# Patient Record
Sex: MALE | Race: WHITE
[De-identification: names, ages, dates, MRNs, and addresses within clinical notes are randomized per-mention and may not be internally consistent; named-entity substitution may affect disease eponyms.]

---

## 2018-04-26 PROBLEM — Z00.00 ENCOUNTER FOR PREVENTIVE HEALTH EXAMINATION: Status: ACTIVE | Noted: 2018-04-26

## 2018-05-03 ENCOUNTER — APPOINTMENT (OUTPATIENT)
Dept: SURGERY | Facility: CLINIC | Age: 63
End: 2018-05-03
Payer: COMMERCIAL

## 2018-05-03 VITALS — WEIGHT: 200 LBS | BODY MASS INDEX: 32.28 KG/M2

## 2018-05-03 PROCEDURE — 99203 OFFICE O/P NEW LOW 30 MIN: CPT

## 2018-06-28 ENCOUNTER — OUTPATIENT (OUTPATIENT)
Dept: OUTPATIENT SERVICES | Facility: HOSPITAL | Age: 63
LOS: 1 days | Discharge: HOME | End: 2018-06-28

## 2018-06-28 DIAGNOSIS — R05 COUGH: ICD-10-CM

## 2019-04-08 ENCOUNTER — EMERGENCY (EMERGENCY)
Facility: HOSPITAL | Age: 64
LOS: 0 days | Discharge: HOME | End: 2019-04-08
Admitting: PHYSICIAN ASSISTANT
Payer: MEDICARE

## 2019-04-08 VITALS
DIASTOLIC BLOOD PRESSURE: 85 MMHG | HEART RATE: 83 BPM | SYSTOLIC BLOOD PRESSURE: 177 MMHG | TEMPERATURE: 98 F | OXYGEN SATURATION: 98 % | RESPIRATION RATE: 18 BRPM

## 2019-04-08 DIAGNOSIS — H11.31 CONJUNCTIVAL HEMORRHAGE, RIGHT EYE: ICD-10-CM

## 2019-04-08 DIAGNOSIS — E78.5 HYPERLIPIDEMIA, UNSPECIFIED: ICD-10-CM

## 2019-04-08 DIAGNOSIS — H57.89 OTHER SPECIFIED DISORDERS OF EYE AND ADNEXA: ICD-10-CM

## 2019-04-08 DIAGNOSIS — I10 ESSENTIAL (PRIMARY) HYPERTENSION: ICD-10-CM

## 2019-04-08 PROCEDURE — 99282 EMERGENCY DEPT VISIT SF MDM: CPT

## 2019-04-08 NOTE — ED PROVIDER NOTE - NSFOLLOWUPINSTRUCTIONS_ED_ALL_ED_FT
Subconjunctival Hemorrhage    WHAT YOU NEED TO KNOW:    A subconjunctival hemorrhage is when blood collects under the conjunctiva in your eye. The conjunctiva is the clear lining that covers the white part of your eye. The blood comes from broken blood vessels under the conjunctiva.    DISCHARGE INSTRUCTIONS:    Care for your eye:     Cold or warm compress: Use a cold pack during the first 24 hours. Ask how often to apply it and for how long each time. After the first 24 hours, apply a warm pack on your eye. Do this 3 times each day for about 10 to 15 minutes each time.      Eyedrops: You may need artificial tears to keep your eye moist. Use the drops as directed.Steps 1 2 3 4         Follow up with your healthcare provider or eye specialist as directed: Write down your questions so you remember to ask them during your visits.    Contact your healthcare provider or eye specialist if:     The redness in your eye has not gone away after 3 weeks.      You have another subconjunctival hemorrhage.      You have subconjunctival hemorrhages in both eyes.      You have questions or concerns about your condition or care.    Return to the emergency department if:     You have eye pain or sensitivity to light.      Your vision changes.      You have white or yellow discharge from your eye.         © Copyright [employer] 2019 All illustrations and images included in CareNotes are the copyrighted property of [employer] or [employer]. Subconjunctival Hemorrhage    WHAT YOU NEED TO KNOW:    A subconjunctival hemorrhage is when blood collects under the conjunctiva in your eye. The conjunctiva is the clear lining that covers the white part of your eye. The blood comes from broken blood vessels under the conjunctiva.    DISCHARGE INSTRUCTIONS:      Follow up with your healthcare provider or eye specialist as tomorrow as scheduled.  Write down your questions so you remember to ask them during your visits.    Contact your healthcare provider or eye specialist if:     The redness in your eye has not gone away after 3 weeks.      You have another subconjunctival hemorrhage.      Return to the emergency department if:     You have eye pain or sensitivity to light.      Your vision changes.      You have white or yellow discharge from your eye.         © Copyright [employer] 2019 All illustrations and images included in CareNotes are the copyrighted property of [employer] or [employer].

## 2019-04-08 NOTE — ED PROVIDER NOTE - OBJECTIVE STATEMENT
64 y/o male with hx HTN, HDL, Glaucoma, Cataracts presents to the ED c/o "I woke up with right eye redness and I feel like something is scratching." no hx trauma/ eye pain/ change in vision/ HA/ dizziness/ nausea/ vomit/ weakness 62 y/o male with hx HTN, HDL, Glaucoma, Cataracts presents to the ED c/o "I woke up with right eye redness and I feel like something is scratching. I was sneezing a lot yesterday." no hx trauma/ eye pain/ change in vision/ HA/ dizziness/ nausea/ vomit/ weakness

## 2019-04-08 NOTE — ED PROVIDER NOTE - PROGRESS NOTE DETAILS
Patient told to return for eye pain or change in vision. Has appointment with his Optho tomorrow am.

## 2019-04-08 NOTE — ED ADULT TRIAGE NOTE - CHIEF COMPLAINT QUOTE
patient c/o right eye redness. patient denies any injury or change in vision but states his eye feels "scratched"

## 2019-04-08 NOTE — ED ADULT NURSE NOTE - OBJECTIVE STATEMENT
Pt presents to the ED with c/o waking up with right eye redness and irritation. Pt denies vision changes or pain.

## 2019-10-22 ENCOUNTER — EMERGENCY (EMERGENCY)
Facility: HOSPITAL | Age: 64
LOS: 0 days | Discharge: HOME | End: 2019-10-23
Attending: EMERGENCY MEDICINE | Admitting: EMERGENCY MEDICINE
Payer: MEDICARE

## 2019-10-22 VITALS
SYSTOLIC BLOOD PRESSURE: 152 MMHG | RESPIRATION RATE: 18 BRPM | TEMPERATURE: 97 F | HEIGHT: 66 IN | HEART RATE: 70 BPM | OXYGEN SATURATION: 99 % | DIASTOLIC BLOOD PRESSURE: 77 MMHG | WEIGHT: 197.98 LBS

## 2019-10-22 DIAGNOSIS — E78.5 HYPERLIPIDEMIA, UNSPECIFIED: ICD-10-CM

## 2019-10-22 DIAGNOSIS — I10 ESSENTIAL (PRIMARY) HYPERTENSION: ICD-10-CM

## 2019-10-22 DIAGNOSIS — R00.2 PALPITATIONS: ICD-10-CM

## 2019-10-22 DIAGNOSIS — M79.602 PAIN IN LEFT ARM: ICD-10-CM

## 2019-10-22 DIAGNOSIS — R42 DIZZINESS AND GIDDINESS: ICD-10-CM

## 2019-10-22 DIAGNOSIS — H40.9 UNSPECIFIED GLAUCOMA: ICD-10-CM

## 2019-10-22 DIAGNOSIS — R07.9 CHEST PAIN, UNSPECIFIED: ICD-10-CM

## 2019-10-22 DIAGNOSIS — M54.2 CERVICALGIA: ICD-10-CM

## 2019-10-22 PROBLEM — E78.00 PURE HYPERCHOLESTEROLEMIA, UNSPECIFIED: Chronic | Status: ACTIVE | Noted: 2019-04-08

## 2019-10-22 LAB
ALBUMIN SERPL ELPH-MCNC: 4.6 G/DL — SIGNIFICANT CHANGE UP (ref 3.5–5.2)
ALP SERPL-CCNC: 74 U/L — SIGNIFICANT CHANGE UP (ref 30–115)
ALT FLD-CCNC: 15 U/L — SIGNIFICANT CHANGE UP (ref 0–41)
ANION GAP SERPL CALC-SCNC: 13 MMOL/L — SIGNIFICANT CHANGE UP (ref 7–14)
AST SERPL-CCNC: 19 U/L — SIGNIFICANT CHANGE UP (ref 0–41)
BILIRUB SERPL-MCNC: 0.4 MG/DL — SIGNIFICANT CHANGE UP (ref 0.2–1.2)
BUN SERPL-MCNC: 18 MG/DL — SIGNIFICANT CHANGE UP (ref 10–20)
CALCIUM SERPL-MCNC: 9.7 MG/DL — SIGNIFICANT CHANGE UP (ref 8.5–10.1)
CHLORIDE SERPL-SCNC: 96 MMOL/L — LOW (ref 98–110)
CO2 SERPL-SCNC: 27 MMOL/L — SIGNIFICANT CHANGE UP (ref 17–32)
CREAT SERPL-MCNC: 0.9 MG/DL — SIGNIFICANT CHANGE UP (ref 0.7–1.5)
GLUCOSE SERPL-MCNC: 121 MG/DL — HIGH (ref 70–99)
HCT VFR BLD CALC: 43.5 % — SIGNIFICANT CHANGE UP (ref 42–52)
HGB BLD-MCNC: 15 G/DL — SIGNIFICANT CHANGE UP (ref 14–18)
MAGNESIUM SERPL-MCNC: 1.7 MG/DL — LOW (ref 1.8–2.4)
MCHC RBC-ENTMCNC: 31.1 PG — HIGH (ref 27–31)
MCHC RBC-ENTMCNC: 34.5 G/DL — SIGNIFICANT CHANGE UP (ref 32–37)
MCV RBC AUTO: 90.2 FL — SIGNIFICANT CHANGE UP (ref 80–94)
NRBC # BLD: 0 /100 WBCS — SIGNIFICANT CHANGE UP (ref 0–0)
NT-PROBNP SERPL-SCNC: 129 PG/ML — SIGNIFICANT CHANGE UP (ref 0–300)
PLATELET # BLD AUTO: 187 K/UL — SIGNIFICANT CHANGE UP (ref 130–400)
POTASSIUM SERPL-MCNC: 3.9 MMOL/L — SIGNIFICANT CHANGE UP (ref 3.5–5)
POTASSIUM SERPL-SCNC: 3.9 MMOL/L — SIGNIFICANT CHANGE UP (ref 3.5–5)
PROT SERPL-MCNC: 7.1 G/DL — SIGNIFICANT CHANGE UP (ref 6–8)
RBC # BLD: 4.82 M/UL — SIGNIFICANT CHANGE UP (ref 4.7–6.1)
RBC # FLD: 12.5 % — SIGNIFICANT CHANGE UP (ref 11.5–14.5)
SODIUM SERPL-SCNC: 136 MMOL/L — SIGNIFICANT CHANGE UP (ref 135–146)
TROPONIN T SERPL-MCNC: <0.01 NG/ML — SIGNIFICANT CHANGE UP
TROPONIN T SERPL-MCNC: <0.01 NG/ML — SIGNIFICANT CHANGE UP
WBC # BLD: 7.11 K/UL — SIGNIFICANT CHANGE UP (ref 4.8–10.8)
WBC # FLD AUTO: 7.11 K/UL — SIGNIFICANT CHANGE UP (ref 4.8–10.8)

## 2019-10-22 PROCEDURE — 93010 ELECTROCARDIOGRAM REPORT: CPT

## 2019-10-22 PROCEDURE — 71046 X-RAY EXAM CHEST 2 VIEWS: CPT | Mod: 26

## 2019-10-22 PROCEDURE — 99220: CPT

## 2019-10-22 PROCEDURE — 93010 ELECTROCARDIOGRAM REPORT: CPT | Mod: 77

## 2019-10-22 RX ORDER — AMLODIPINE BESYLATE 2.5 MG/1
10 TABLET ORAL ONCE
Refills: 0 | Status: COMPLETED | OUTPATIENT
Start: 2019-10-22 | End: 2019-10-22

## 2019-10-22 RX ORDER — ATORVASTATIN CALCIUM 80 MG/1
10 TABLET, FILM COATED ORAL ONCE
Refills: 0 | Status: COMPLETED | OUTPATIENT
Start: 2019-10-22 | End: 2019-10-22

## 2019-10-22 RX ADMIN — ATORVASTATIN CALCIUM 10 MILLIGRAM(S): 80 TABLET, FILM COATED ORAL at 20:51

## 2019-10-22 RX ADMIN — AMLODIPINE BESYLATE 10 MILLIGRAM(S): 2.5 TABLET ORAL at 20:51

## 2019-10-22 NOTE — ED PROVIDER NOTE - OBJECTIVE STATEMENT
65 yo M with hx of HTN, HDL, glaucoma, presents for evaluation of chest pain, radiating to the left neck and left arm, intermittent, ongoing for a couple of days, associated with difficulty breathing and lightheadedness on onset. No fever, no chills, no headache, no back pain, no abdominal pain, no recent abx use. Pt states that symptoms occurred in the past but they went away, this time they keep coming back. No other symptoms reported.   LISA Franklin

## 2019-10-22 NOTE — ED CDU PROVIDER INITIAL DAY NOTE - MEDICAL DECISION MAKING DETAILS
Patient presented with chest pain to ED. Initial work up including EKG, troponin negative. Plan is to place patient in obs for CCTA and echo. Asymptomatic at this time, no acute events overnight. Will monitor and follow up results.

## 2019-10-22 NOTE — ED PROVIDER NOTE - ATTENDING CONTRIBUTION TO CARE
65yo man h/o HTN, HLD, glaucoma c/o 1 week of chest discomfort, feels like skipped beats/palpitations, on exertion and at rest. Has had these sx before many years ago, last stress test was 2 years ago and normal. He is concerned because the chest discomfort is new, with some radiation down his left arm, and he feels unusually fatigued. On exam he is alert and nontoxic appearing, lungs CTA, CVS1S2 grade 2/6 systolic murmur (known to pt), abd soft, NT, trace b/l pitting edema. WIll check labs, EKG, CXR, speak with cardiology (Dr Cano), reassess.

## 2019-10-22 NOTE — ED PROVIDER NOTE - NEUROLOGICAL, MLM
Alert and oriented x 3, CN II-XII intact, 5/5 UE and LE strength, 5/5  strength, normal gait, ambulating with no difficulty, no focal deficits, no motor or sensory deficits.

## 2019-10-22 NOTE — ED ADULT NURSE NOTE - OBJECTIVE STATEMENT
Pt c/o intermittent chest palpitations that causes SOB. Pt states he also has pain radiating down left arm and dizziness when palpitations occur. Pt not in distress and does not have symptoms at this time. Pt last stress test was in 2017

## 2019-10-22 NOTE — ED PROVIDER NOTE - CLINICAL SUMMARY MEDICAL DECISION MAKING FREE TEXT BOX
Labs, EKG, CXR ok. Concerned for poss arrhythmia; will place in CDU for echo, CCTA vs nuc. Pt amenable to plan.

## 2019-10-22 NOTE — ED ADULT TRIAGE NOTE - CHIEF COMPLAINT QUOTE
"jonn been dizzy on and off for the past week. also last week I felt like I missed a beat in my heart but a long beat and then the dizziness started. I haVE A PAIN IN MY LEFT SHOULDER UP MY JAW AND DOWN TO MY ELBOW"

## 2019-10-22 NOTE — ED CDU PROVIDER INITIAL DAY NOTE - OBJECTIVE STATEMENT
63 y/o M, PMHx HTN, Dyslipidemia & Glaucoma, presents to the Observation Unit with complaints of chest pain x one week. Patient admits to having been with chest discomfort with radiation to his left arm over the past week however states that episodes of discomfort have been occurring more frequently. He denies associated dyspnea, nausea, vomiting, fever, chills, back pain, abdominal pain, recent travel/immobilization and recent known sick contacts. He is not a smoker; denies FHx of CAD. ED team spoke with patient's cardiologist who recommended observation unit placement for CCTA and echo.

## 2019-10-22 NOTE — ED ADULT NURSE REASSESSMENT NOTE - NS ED NURSE REASSESS COMMENT FT1
Patient assessed, alert and oriented x 3. Patient accompanied by family. Patient noted with nonsymptomatic bradycardia, on ongoing cardiac monitoring. MD aware. Patient voices no complaints at this time. Patient scheduled for CCTA/ Transthoracic Echocardiogram later today. Will continue to monitor.

## 2019-10-22 NOTE — ED ADULT NURSE NOTE - NSIMPLEMENTINTERV_GEN_ALL_ED
Implemented All Universal Safety Interventions:  [place] to call system. Call bell, personal items and telephone within reach. Instruct patient to call for assistance. Room bathroom lighting operational. Non-slip footwear when patient is off stretcher. Physically safe environment: no spills, clutter or unnecessary equipment. Stretcher in lowest position, wheels locked, appropriate side rails in place.

## 2019-10-23 VITALS
HEART RATE: 57 BPM | OXYGEN SATURATION: 97 % | TEMPERATURE: 98 F | SYSTOLIC BLOOD PRESSURE: 144 MMHG | DIASTOLIC BLOOD PRESSURE: 21 MMHG | RESPIRATION RATE: 18 BRPM

## 2019-10-23 PROCEDURE — 99217: CPT

## 2019-10-23 PROCEDURE — 93306 TTE W/DOPPLER COMPLETE: CPT | Mod: 26

## 2019-10-23 PROCEDURE — 75574 CT ANGIO HRT W/3D IMAGE: CPT | Mod: 26

## 2019-10-23 NOTE — ED ADULT NURSE REASSESSMENT NOTE - NS ED NURSE REASSESS COMMENT FT1
patient a.ox4 at bedside, patient states he feels moments of weakness and dizziness unknown onset. patient on cardiac monitor showing sinus bradycardia. denies any chest pain or sob. patient awaiting ccta. will continue to assess and monitor

## 2019-10-23 NOTE — ED CDU PROVIDER DISPOSITION NOTE - CARE PROVIDER_API CALL
Beena Cano)  Cardiology; Interventional Cardiology  [address] [place] [address]  Phone: [phone]  Fax: [phone]  Follow Up Time:

## 2019-10-23 NOTE — ED CDU PROVIDER DISPOSITION NOTE - CLINICAL COURSE
Patient presented with chest pain. Initial work up in ED negative including non-ischemic EKG, negative troponin. Placed in obs for further ACS rule out. Obtained serial trops which were negative. CCTA done and showed CAD RA score of 1. Echo done and without any significant abnormalities. Patient asymptomatic during obs course. Ambulatory, tolerates PO. Will discharge home with outpatient follow up. Patient agreeable with plan. Agrees to return to ED for any new or worsening symptoms.

## 2019-10-23 NOTE — ED CDU PROVIDER DISPOSITION NOTE - PATIENT PORTAL LINK FT
You can access the FollowMyHealth Patient Portal offered by [facility] by registering at the following website: [email or web address]. By joining [facility]’s FollowMyHealth portal, you will also be able to view your health information using other applications (apps) compatible with our system.

## 2019-10-23 NOTE — CONSULT NOTE ADULT - SUBJECTIVE AND OBJECTIVE BOX
Date of Admission:    CHIEF COMPLAINT: Palpitations    HISTORY OF PRESENT ILLNESS: 64yMale with PMH below presented to the hospital for palpitation, with chest pain, and dizziness    PAST MEDICAL & SURGICAL HISTORY:  High cholesterol  HTN (hypertension)  JAVI on CPAP    HEALTH ISSUES - PROBLEM Dx:        FAMILY HISTORY:    Allergies    No Known Allergies    Intolerances    	  Home Medications:    MEDICATIONS  (STANDING):    MEDICATIONS  (PRN):              SOCIAL HISTORY:    [x ] Non-smoker  [ ] Smoker  [ ] Alcohol      REVIEW OF SYSTEMS:  CONSTITUTIONAL: No fever, weight loss, or fatigue  CARDIOLOGY: PAtient denies chest pain, shortness of breath or syncopal episodes.   RESPIRATORY: denies shortness of breath, wheezeing.   NEUROLOGICAL: NO weakness, no focal deficits to report.  ENDOCRINOLOGICAL: no recent change in diabetic medications.   GI: no BRBPR, no N,V,diarrhea.    PSYCHIATRY: normal mood and affect  HEENT: no nasal discharge, no ecchymosis  SKIN: no ecchymosis, no breakdown  MUSCULOSKELETAL: Full range of motion x4.      PHYSICAL EXAM:  T(C): 36.7 (10-23-19 @ 07:58), Max: 36.7 (10-23-19 @ 07:58)  HR: 57 (10-23-19 @ 07:58) (50 - 58)  BP: 144/21 (10-23-19 @ 07:58) (126/77 - 144/21)  RR: 18 (10-23-19 @ 07:58) (18 - 18)  SpO2: 97% (10-23-19 @ 07:58) (97% - 98%)  Wt(kg): --  I&O's Summary    Daily     Daily     General Appearance: Normal	  Cardiovascular: Normal S1 S2, No JVD, No murmurs, No edema  Respiratory: Lungs clear to auscultation	  Psychiatry: A & O x 3, Mood & affect appropriate  Gastrointestinal:  Soft, Non-tender  Skin: No rashes, No ecchymoses, No cyanosis	  Neurologic: Non-focal  Extremities: Normal range of motion, No clubbing, cyanosis or edema  Vascular: Peripheral pulses palpable 2+ bilaterally        LABS:	 	                          15.0   7.11  )-----------( 187      ( 22 Oct 2019 11:54 )             43.5     10-22    136  |  96<L>  |  18  ----------------------------<  121<H>  3.9   |  27  |  0.9    Ca    9.7      22 Oct 2019 11:54  Mg     1.7     10-22    TPro  7.1  /  Alb  4.6  /  TBili  0.4  /  DBili  x   /  AST  19  /  ALT  15  /  AlkPhos  74  10-22    CARDIAC MARKERS ( 22 Oct 2019 16:19 )  x     / <0.01 ng/mL / x     / x     / x      CARDIAC MARKERS ( 22 Oct 2019 11:54 )  x     / <0.01 ng/mL / x     / x     / x              proBNP:   Lipid Profile:   HgA1c:   TSH:       CARDIAC MARKERS:            TELEMETRY EVENTS: 	    ECG:  	  RADIOLOGY:  OTHER: 	    PREVIOUS DIAGNOSTIC TESTING:    [ ] Echocardiogram:  [ ]  Catheterization:  [ ] Stress Test:  	  	  ASSESSMENT/PLAN: 	    Echo and CT scan reviewed  Positive caicium score  discharge home and follow up with EP

## 2019-10-23 NOTE — ED ADULT NURSE REASSESSMENT NOTE - NS ED NURSE REASSESS COMMENT FT1
Patient assessed, alert and oriented x 3. Patient resting comfortably in bed. Patient remains on ongoing cardiac monitoring, noted with sinus bradycardia. Patient voices no complaints at this time. Patient scheduled for CCTA/ Transthoracic Echocardiogram later today. Safety and comfort measures performed. Will continue to monitor.

## 2019-10-23 NOTE — ED CDU PROVIDER SUBSEQUENT DAY NOTE - PROGRESS NOTE DETAILS
pt. in no distress, negative trops x2. will get ccta in the morning. will continue to reassess. pt seen bedside, NAD, no complaints overnight, asymptomatic. Negative cardiac enzymes x2 and nl ekg. pt scheduled to go for  CCTA. Will continue to monitor patient.

## 2020-05-11 NOTE — ED CDU PROVIDER SUBSEQUENT DAY NOTE - MEDICAL DECISION MAKING DETAILS
Patient presented with chest pain, lightheadedness in ED. Initial work up in ED without significant abnormalities including non-ischemic EKG, negative troponin. Plan is to place in obs for serial trops, CCTA, echo. Patient otherwise HD stable. CCTA and echo pending. Will re-evaluate after work up. No

## 2021-01-01 NOTE — ED PROVIDER NOTE - ENMT, MLM
Minimal
Airway patent, Nasal mucosa clear. Mouth with normal mucosa. Throat has no vesicles, no oropharyngeal exudates and uvula is midline.

## 2022-06-02 ENCOUNTER — APPOINTMENT (OUTPATIENT)
Dept: SURGERY | Facility: CLINIC | Age: 67
End: 2022-06-02
Payer: MEDICARE

## 2022-06-02 VITALS — HEIGHT: 66 IN | WEIGHT: 198 LBS | BODY MASS INDEX: 31.82 KG/M2

## 2022-06-02 DIAGNOSIS — S76.219A STRAIN OF ADDUCTOR MUSCLE, FASCIA AND TENDON OF UNSPECIFIED THIGH, INITIAL ENCOUNTER: ICD-10-CM

## 2022-06-02 DIAGNOSIS — E66.09 OTHER OBESITY DUE TO EXCESS CALORIES: ICD-10-CM

## 2022-06-02 PROCEDURE — 99203 OFFICE O/P NEW LOW 30 MIN: CPT

## 2022-06-02 NOTE — ASSESSMENT
[FreeTextEntry1] : Edvin is a pleasant 66-year-old retired gentleman with a past medical history significant for hypertension, hypercholesterolemia, sleep apnea, anxiety and depression along with 5 inguinal hernia repairs in the past with his most recent bilateral recurrent hernia repair approximately 9 years ago by Dr. Philip.  He initially saw me in May 2018 with left groin discomfort which began after shoveling snow and was diagnosed with a significant groin muscle strain which improved spontaneously.  He presents back to the office now with bilateral groin pain which began approximately 1 month ago after straining during a significant bowel movement.  As before, his symptoms have nearly resolved but he kept his appointment today for reassurance.\par \par Physical examination demonstrates well-healed scars in both groins with no evidence of hernia recurrence or delayed wound complications on either side.  He does have some moderate weakness in the left inguinal floor but no obvious hernia.  Both testicles are normal.  His umbilical examination demonstrates a moderate weakness but no obvious hernia.  He does have a mild to moderate diastases recti likely related to his excess abdominal weight.  He has gained 6 pounds since his last visit with me and his current BMI is now 32.\par \par Edvin was counseled and reassured.  I believe his symptoms are related to significant muscle strain due to overexertion and chronic weakness in both groins due to multiple repairs in the past and since his symptoms have already resolved no intervention is necessary.  We also again discussed the importance of calorie restriction and healthy eating with regard to weight loss, hernia recurrence and his overall health.  He may return to me in the future if any new issues arise, of course.

## 2022-06-02 NOTE — CONSULT LETTER
[FreeTextEntry1] : Dear Dr. Madi Franklin, \par \par I had the pleasure of seeing your patient, JONO SOLOMON, in my office today. Please see my note below. \par \par Thank you very much for allowing me to participate in the care of this patient. If you have any questions, please do not hesitate to contact me. \par \par \par Respectfully,\par \par Mitchel Leon M.D., FACS

## 2022-06-02 NOTE — PHYSICAL EXAM
[JVD] : no jugular venous distention  [Normal Breath Sounds] : Normal breath sounds [No Rash or Lesion] : No rash or lesion [Alert] : alert [Calm] : calm [de-identified] : Overweight [de-identified] : normal [de-identified] : protuberant abdomen, moderate diastases recti [de-identified] : normal testicles [de-identified] : no evidence of hernia recurrence

## 2022-12-09 ENCOUNTER — APPOINTMENT (OUTPATIENT)
Dept: CARDIOLOGY | Facility: CLINIC | Age: 67
End: 2022-12-09

## 2022-12-09 VITALS
BODY MASS INDEX: 30.61 KG/M2 | DIASTOLIC BLOOD PRESSURE: 79 MMHG | SYSTOLIC BLOOD PRESSURE: 128 MMHG | TEMPERATURE: 97.9 F | WEIGHT: 195 LBS | HEART RATE: 57 BPM | HEIGHT: 67 IN

## 2022-12-09 DIAGNOSIS — Z78.9 OTHER SPECIFIED HEALTH STATUS: ICD-10-CM

## 2022-12-09 DIAGNOSIS — Z87.891 PERSONAL HISTORY OF NICOTINE DEPENDENCE: ICD-10-CM

## 2022-12-09 DIAGNOSIS — Z84.89 FAMILY HISTORY OF OTHER SPECIFIED CONDITIONS: ICD-10-CM

## 2022-12-09 PROCEDURE — 99205 OFFICE O/P NEW HI 60 MIN: CPT | Mod: 25

## 2022-12-09 PROCEDURE — 93000 ELECTROCARDIOGRAM COMPLETE: CPT

## 2022-12-14 ENCOUNTER — NON-APPOINTMENT (OUTPATIENT)
Age: 67
End: 2022-12-14

## 2022-12-18 NOTE — END OF VISIT
[FreeTextEntry3] : I, Zeyad Pierce, personally performed the services described in this documentation. All medical record entries made by the scribe/nurse CTA were at my direction and in my presence. I have reviewed the chart and agree that the record reflects my personal performance and is accurate and complete.\par  [Time Spent: ___ minutes] : I have spent [unfilled] minutes of time on the encounter.

## 2022-12-18 NOTE — ADDENDUM
[FreeTextEntry1] : Zaida GILBERT assisted in documentation on 12/10/2022 acting as a scribe for Stan Gama.\par

## 2022-12-18 NOTE — CARDIOLOGY SUMMARY
[de-identified] : (12/9/2022)/ Sinus Rhythm at 57 bpm, LVH, 1st degree AV block with  ms, no significant STT wave abnormalities. \par (11/14/2022)/ Coarse Atrial fibrillation at 58 bpm, possibly atrial flutter. \par (10/11/2022)/ Typical atrial flutter, with 4:1 AV conduction and ventricular rate at 58 bpm. [de-identified] :  (6/6/2022)/ 2D echo LV size normal, LV EF 57%, LA mildly dilated, mild-moderate valve stenosis, mild MR, mild TR, no other abnormalities. \par (10/26/2020)/ 2D echo mild LVH, normal LV EF, LA size is normal, mild-moderate aortic stenosis, trace MR, trace TR, no other abnormalities.

## 2022-12-18 NOTE — REASON FOR VISIT
[Arrhythmia/ECG Abnorrmalities] : arrhythmia/ECG abnormalities [FreeTextEntry3] : Dr. Madi Franklin - Dr. Beena Cano

## 2022-12-18 NOTE — HISTORY OF PRESENT ILLNESS
[FreeTextEntry1] : Hypertension, Hyperlipidemia, Moderate Aortic Stenosis, Obstructive Sleep Apnea on CPAP, Obesity with a BMI of 30.5, new onset Paroxysmal Atrial Fibrillation, and Atrial Flutter.\par \par Patient was diagnosed with an atrial flutter on a routine ECG with his primary care doctor in October 2022. He saw Dr. Cano on 11/14/2022, and ECG showed atrial fibrillation. He is being treated with Metoprolol for rate control and Xarelto for stroke prevention. Patient has no bleeding on Xarelto. He reports feeling tired and short of breath when he has afib. He also reports feeling dizzy and fainty without having any real syncope. He had dizziness for a long time, but in the recent few weeks, the dizziness got worse. Patient has no angina, no dyspnea on exertion when in sinus rhythm, and no palpitations. He is scheduled for a CTA over coronaries at [facility] on 1/26/2022. \par \par He presents today in sinus rhythm. \par \par He is here to discuss the management of atrial fibrillation.

## 2022-12-18 NOTE — DISCUSSION/SUMMARY
[FreeTextEntry1] : Mr. Edvin Ribeiro is a pleasant 67-year-old man with hypertension, hyperlipidemia, obstructive sleep apnea on CPAP, moderate aortic stenosis, paroxysmal atrial fibrillation, and atrial flutter. Patient was diagnosed with atrial flutter in October 2022 and atrial fibrillation in November 2022. He is on Metoprolol for rate control, and he is on Xarelto.\par \par I discussed with patient the management strategy for atrial fibrillation at length, including medical therapy with calcium channel blocker and beta blocker, antiarrhythmics therapy, and catheter ablation. I also discussed stroke prevention at length. I recommend continuing the same medication as patient is rate controlled at metoprolol 50. I also recommend continuing Xarelto for stroke prevention.\par \par In view of his dizziness, patient might be having a period of bradycardia. I recommend a four-week MCOT to correlate between his dizziness, lightheadedness, and feeling fainty and any Bradyarrhythmias secondary to medications. Other possibilities for his dizziness are conversion pauses as he is today sinus rhythm, and he was in atrial fibrillation on November 14. \par \par Since his symptoms do not occur on a daily basis, a Holter monitor is less likely to be helpful in his management. I recommend a 4 weeks event monitor to evaluate for the etiology of his symptoms. I educated the patient on the use of symptoms’ trigger feature of the event monitor. I will reassess patient after completion of the 4 weeks event monitor.\par \par I discussed with him the plan of care in great details. I answered all his questions and he was satisfied with the visit. Patient will follow with me in 4-6 weeks' time. Please do not hesitate to contact me at [phone] if you have any questions regarding his care.\par \par The management strategies for atrial fibrillation were discussed focusing on the issues of stroke prevention, heart rate control and rhythm control. The options of rate control, antiarrhythmic drug therapy, and electrophysiologic testing and catheter ablation therapy were discussed at length. As he is not keen on long term antiarrhythmic medication, he is a good candidate for catheter ablation of atrial fibrillation in the form of pulmonary vein isolation. I have discussed the procedure with him in great detail. He was told this procedure is performed under anesthesia with the duration of about four hours. Possible complications include but not limited to bleeding, vascular injury, groin complications, cardiac tamponade, stroke, esophageal injury, pulmonary vein stenosis, need for pacemaker, need for cardiac surgery, and rare risks of esophageal fistula/stroke/heart attack/death. Intracardiac echo is used to monitor the procedure. In addition, we use a temperature probe in the esophagus to prevent lesions. The success rate is in the range 70-80%. About 20% of patients require a second procedure for pulmonary vein reconnection. \par \par Procedure will be planned on \par \par He verbalized understanding of the discussion and all questions were addressed and answered. Patient is not sure whether to proceed with catheter ablation or not, he had many questions he and his wife that I answered at length. I will schedule an RN to follow-up call with patient in 2 weeks regarding his decision on whether to proceed with catheter ablation in the month of March 2023. Patient daughter is an RN that works at [employer].\par \par I discussed with patient plan of care in great details. I answered all his questions to his satisfaction. Patient was pleased with the visit.\par \par Patient will follow with me in 2 months’ time. Please do not hesitate to contact me at [phone] if you have any further questions regarding this patient care.\par \par  [EKG obtained to assist in diagnosis and management of assessed problem(s)] : EKG obtained to assist in diagnosis and management of assessed problem(s)

## 2022-12-29 ENCOUNTER — EMERGENCY (EMERGENCY)
Facility: HOSPITAL | Age: 67
LOS: 0 days | Discharge: HOME | End: 2022-12-29
Attending: EMERGENCY MEDICINE | Admitting: EMERGENCY MEDICINE
Payer: MEDICARE

## 2022-12-29 VITALS
RESPIRATION RATE: 18 BRPM | SYSTOLIC BLOOD PRESSURE: 190 MMHG | TEMPERATURE: 100 F | WEIGHT: 195.11 LBS | OXYGEN SATURATION: 98 % | HEART RATE: 100 BPM | DIASTOLIC BLOOD PRESSURE: 95 MMHG

## 2022-12-29 DIAGNOSIS — I10 ESSENTIAL (PRIMARY) HYPERTENSION: ICD-10-CM

## 2022-12-29 DIAGNOSIS — H01.003 UNSPECIFIED BLEPHARITIS RIGHT EYE, UNSPECIFIED EYELID: ICD-10-CM

## 2022-12-29 DIAGNOSIS — H57.89 OTHER SPECIFIED DISORDERS OF EYE AND ADNEXA: ICD-10-CM

## 2022-12-29 DIAGNOSIS — H00.033 ABSCESS OF EYELID RIGHT EYE, UNSPECIFIED EYELID: ICD-10-CM

## 2022-12-29 PROCEDURE — 99283 EMERGENCY DEPT VISIT LOW MDM: CPT

## 2022-12-29 RX ORDER — FLUORESCEIN SODIUM 9 MG
1 STRIP OPHTHALMIC (EYE) ONCE
Refills: 0 | Status: COMPLETED | OUTPATIENT
Start: 2022-12-29 | End: 2022-12-29

## 2022-12-29 RX ORDER — CEPHALEXIN 500 MG
1 CAPSULE ORAL
Qty: 28 | Refills: 0
Start: 2022-12-29 | End: 2023-01-04

## 2022-12-29 RX ADMIN — Medication 1 DROP(S): at 09:35

## 2022-12-29 RX ADMIN — Medication 1 APPLICATION(S): at 09:35

## 2022-12-29 NOTE — ED PROVIDER NOTE - NSFOLLOWUPCLINICS_GEN_ALL_ED_FT
[facility]  Ophthalmolgy  [address], [address]  [place] [address]  Phone: [phone]  Fax:

## 2022-12-29 NOTE — ED PROVIDER NOTE - NSFOLLOWUPINSTRUCTIONS_ED_ALL_ED_FT
please call to make an appointment at Ophthalmology clinic for follow up        Cellulitis, Adult    A person's legs and feet. One leg is normal and the other leg is affected by cellulitis.   Cellulitis is a skin infection. The infected area is usually warm, red, swollen, and tender. This condition occurs most often in the arms and lower legs. The infection can travel to the muscles, blood, and underlying tissue and become serious. It is very important to get treated for this condition.      What are the causes?    Cellulitis is caused by bacteria. The bacteria enter through a break in the skin, such as a cut, burn, insect bite, open sore, or crack.      What increases the risk?    This condition is more likely to occur in people who:  •Have a weak body defense system (immune system).      •Have open wounds on the skin, such as cuts, burns, bites, and scrapes. Bacteria can enter the body through these open wounds.      •Are older than 60 years of age.      •Have diabetes.      •Have a type of long-lasting (chronic) liver disease (cirrhosis) or kidney disease.      •Are obese.    •Have a skin condition such as:  •Itchy rash (eczema).      •Slow movement of blood in the veins (venous stasis).      •Fluid buildup below the skin (edema).        •Have had radiation therapy.      •Use IV drugs.        What are the signs or symptoms?    Symptoms of this condition include:  •Redness, streaking, or spotting on the skin.      •Swollen area of the skin.      •Tenderness or pain when an area of the skin is touched.      •Warm skin.      •A fever.      •Chills.      •Blisters.        How is this diagnosed?    This condition is diagnosed based on a medical history and physical exam. You may also have tests, including:  •Blood tests.      •Imaging tests.        How is this treated?    Treatment for this condition may include:  •Medicines, such as antibiotic medicines or medicines to treat allergies (antihistamines).      •Supportive care, such as rest and application of cold or warm cloths (compresses) to the skin.      •Hospital care, if the condition is severe.      The infection usually starts to get better within 1–2 days of treatment.      Follow these instructions at home:  A comparison of three sample cups showing dark yellow, yellow, and pale yellow urine.   Medicines     •Take over-the-counter and prescription medicines only as told by your health care provider.      •If you were prescribed an antibiotic medicine, take it as told by your health care provider. Do not stop taking the antibiotic even if you start to feel better.      General instructions     •Drink enough fluid to keep your urine pale yellow.      • Do not touch or rub the infected area.      •Raise (elevate) the infected area above the level of your heart while you are sitting or lying down.      •Apply warm or cold compresses to the affected area as told by your health care provider.      •Keep all follow-up visits as told by your health care provider. This is important. These visits let your health care provider make sure a more serious infection is not developing.        Contact a health care provider if:    •You have a fever.      •Your symptoms do not begin to improve within 1–2 days of starting treatment.      •Your bone or joint underneath the infected area becomes painful after the skin has healed.      •Your infection returns in the same area or another area.      •You notice a swollen bump in the infected area.      •You develop new symptoms.      •You have a general ill feeling (malaise) with muscle aches and pains.        Get help right away if:    •Your symptoms get worse.      •You feel very sleepy.      •You develop vomiting or diarrhea that persists.      •You notice red streaks coming from the infected area.      •Your red area gets larger or turns dark in color.      These symptoms may represent a serious problem that is an emergency. Do not wait to see if the symptoms will go away. Get medical help right away. Call your local emergency services (911 in the [place] Do not drive yourself to the hospital.       Summary    •Cellulitis is a skin infection. This condition occurs most often in the arms and lower legs.      •Treatment for this condition may include medicines, such as antibiotic medicines or antihistamines.      •Take over-the-counter and prescription medicines only as told by your health care provider. If you were prescribed an antibiotic medicine, do not stop taking the antibiotic even if you start to feel better.      •Contact a health care provider if your symptoms do not begin to improve within 1–2 days of starting treatment or your symptoms get worse.      •Keep all follow-up visits as told by your health care provider. This is important. These visits let your health care provider make sure that a more serious infection is not developing.      This information is not intended to replace advice given to you by your health care provider. Make sure you discuss any questions you have with your health care provider.

## 2022-12-29 NOTE — ED PROVIDER NOTE - PHYSICAL EXAMINATION
CONST: Well appearing in NAD  EYES: PERRL, EOMI, Sclera and conjunctiva clear. No injection. R blepharitis, erythematous, with area of scabbed over excoriation.  Flattened maculopapular lesions 2-3 to R temple. No vesicles. Visual acuity 20/25 bilaterally  ENT: No nasal discharge. TM's clear B/L without drainage. No vesicles in ear. Oropharynx normal appearing, no erythema or exudates. Uvula midline. Apthous ulcers to R buccal mucosa. (pt denies mouth pain)  NECK: Non-tender  CARD: Normal S1 S2; Normal rate and rhythm  RESP: Equal BS B/L, No wheezes, rhonchi or rales. No distress  GI: Soft, non-tender, non-distended.  MS: Normal ROM in all extremities. No midline spinal tenderness.  SKIN: Warm, dry, no acute rashes. Good turgor  NEURO: A&Ox3, No focal deficits. Strength 5/5 with no sensory deficits. Steady gait

## 2022-12-29 NOTE — ED PROVIDER NOTE - PATIENT PORTAL LINK FT
You can access the FollowMyHealth Patient Portal offered by [facility] by registering at the following website: [email or web address]. By joining [facility]’s FollowMyHealth portal, you will also be able to view your health information using other applications (apps) compatible with our system.

## 2022-12-29 NOTE — ED PROVIDER NOTE - CLINICAL SUMMARY MEDICAL DECISION MAKING FREE TEXT BOX
Patient with right eye cellulitis and blepharitis there is no corneal uptake we will treat with antibiotics outpatient follow-up of note patient has aphthous ulcers to the left posterior oropharynx of unclear etiology.

## 2022-12-29 NOTE — ED ADULT TRIAGE NOTE - CHIEF COMPLAINT QUOTE
Patient c/o right eye redness, swelling with spots radiating to mouth. Drainage present to site, does not cross midline. Patient states site is not painful but "pinching sensation is present"

## 2022-12-29 NOTE — ED PROVIDER NOTE - PROVIDER TOKENS
FREE:[LAST:[CARE PROVIDER],FIRST:[YOUR PRIMARY],PHONE:[(   )    -],FAX:[(   )    -],ADDRESS:[IN 48HRS]]

## 2022-12-29 NOTE — ED PROVIDER NOTE - NS ED ROS FT
CONST: No fever, chills or bodyaches  EYES: No pain, redness, drainage or visual changes.  ENT: No ear pain or discharge, nasal discharge or congestion. No sore throat  CARD: No chest pain, palpitations  RESP: No SOB, cough  MS: No joint pain, back pain or extremity pain/injury  SKIN: see HPI  NEURO: No headache, dizziness, paresthesias

## 2022-12-29 NOTE — ED PROVIDER NOTE - OBJECTIVE STATEMENT
66yo male with PMHx HTN presents c/o redness swelling overlying R eye lid and side of face x 4 days. Pt reports awoke with redness, swelling over R eyelid 4 days and now noticing rash to side of R temple. Pt denies pain. Denies itching. Denies blurry vision or eye pain. Pt states he is unsure of origin but either thinks it is due to a scratch on his eyelid that he self induced, that he kept picking out, or due to chemical exposure. Neither were wearing eye portection. Notes two weeks prior him and wife were using harsh chemicals to clean bathroom and the following day wife had similar R eye swelling that resolved in several days. No aggravating or relieving factors to rash. No hearing changes or ear pain. No vesicles or drainage.

## 2022-12-29 NOTE — ED PROVIDER NOTE - ATTENDING APP SHARED VISIT CONTRIBUTION OF CARE
67-year-old male history of hypertension here evaluation of right eye redness swelling to the right eyelid and face for last 4 days.  Patient reports woke up with redness swelling is now noticing as well as rash to the right temple.  He has no pain to the eye no fever no chills he does endorse he scratched his eyelid which might of caused his initial exposure.  Agree with above exam  Patient with right eye cellulitis and blepharitis there is no corneal uptake we will treat with antibiotics outpatient follow-up of note patient has aphthous ulcers to the left posterior oropharynx of unclear etiology.

## 2022-12-31 NOTE — ED PROCEDURE NOTE - PROCEDURE ADDITIONAL DETAILS
2gtts tetracaine instilled to R eye. No fluorescein uptake noted. No dendritic lesions. No FB or abrasions.

## 2023-01-12 NOTE — ED ADULT NURSE NOTE - NS ED NURSE LEVEL OF CONSCIOUSNESS ORIENTATION
-- DO NOT REPLY / DO NOT REPLY ALL --  -- Message is from Engagement Center Operations ([facility]) --    Offered Waitlist if Available for the Visit Type? Yes    Caller is requesting an appointment - at a sooner time than what was available.      Caller wants sooner appointment - offered other approved options    Reason for Visit: Complete physical     Is the patient currently scheduled? No    Preferred time to be seen: n/a    Caller Information       Type Contact Phone/Fax    01/11/2023 04:02 PM CST Phone (Incoming) Kecia Gutierrez (Self) [phone] (M)          Alternative phone number: [phone] ()    Can a detailed message be left? Yes    Message Turnaround: [place]:    Refer to site's KB page for routing instructions    Please give this turnaround time to the caller:   \"You can expect to receive a response 1-3 business days after your provider's clinical team reviews the message\"  
Outreach attempt was made to schedule a follow up visit. This was the first attempt. Contact was made, appointment scheduled.  
Tried to offer sooner appointment, went to voice mail.  Left voice mail.    Will forward to schedulers. If something urgent, fine to put in any 30-40 minute slot (depending on if before or after the scheduling change).    Sandy Palafox MD, Internal Medicine  1/11/2023 4:28 PM   
Oriented - self; Oriented - place; Oriented - time

## 2023-02-07 NOTE — ED ADULT TRIAGE NOTE - RESPIRATORY RATE (BREATHS/MIN)
18 Topical Steroids Applications Pregnancy And Lactation Text: Most topical steroids are considered safe to use during pregnancy and lactation.  Any topical steroid applied to the breast or nipple should be washed off before breastfeeding.

## 2023-02-10 ENCOUNTER — OUTPATIENT (OUTPATIENT)
Dept: INPATIENT UNIT | Facility: HOSPITAL | Age: 68
LOS: 1 days | Discharge: HOME | End: 2023-02-10
Payer: MEDICARE

## 2023-02-10 DIAGNOSIS — Z98.890 OTHER SPECIFIED POSTPROCEDURAL STATES: Chronic | ICD-10-CM

## 2023-02-10 DIAGNOSIS — I20.0 UNSTABLE ANGINA: ICD-10-CM

## 2023-02-10 DIAGNOSIS — I48.0 PAROXYSMAL ATRIAL FIBRILLATION: ICD-10-CM

## 2023-02-10 LAB
ANION GAP SERPL CALC-SCNC: 10 MMOL/L — SIGNIFICANT CHANGE UP (ref 7–14)
BUN SERPL-MCNC: 19 MG/DL — SIGNIFICANT CHANGE UP (ref 10–20)
CALCIUM SERPL-MCNC: 10.2 MG/DL — SIGNIFICANT CHANGE UP (ref 8.4–10.5)
CHLORIDE SERPL-SCNC: 100 MMOL/L — SIGNIFICANT CHANGE UP (ref 98–110)
CO2 SERPL-SCNC: 32 MMOL/L — SIGNIFICANT CHANGE UP (ref 17–32)
CREAT SERPL-MCNC: 0.9 MG/DL — SIGNIFICANT CHANGE UP (ref 0.7–1.5)
EGFR: 92 ML/MIN/1.73M2 — SIGNIFICANT CHANGE UP
GLUCOSE SERPL-MCNC: 136 MG/DL — HIGH (ref 70–99)
HCT VFR BLD CALC: 46.1 % — SIGNIFICANT CHANGE UP (ref 42–52)
HGB BLD-MCNC: 15.7 G/DL — SIGNIFICANT CHANGE UP (ref 14–18)
MCHC RBC-ENTMCNC: 30.6 PG — SIGNIFICANT CHANGE UP (ref 27–31)
MCHC RBC-ENTMCNC: 34.1 G/DL — SIGNIFICANT CHANGE UP (ref 32–37)
MCV RBC AUTO: 89.9 FL — SIGNIFICANT CHANGE UP (ref 80–94)
NRBC # BLD: 0 /100 WBCS — SIGNIFICANT CHANGE UP (ref 0–0)
PLATELET # BLD AUTO: 180 K/UL — SIGNIFICANT CHANGE UP (ref 130–400)
POTASSIUM SERPL-MCNC: 4.7 MMOL/L — SIGNIFICANT CHANGE UP (ref 3.5–5)
POTASSIUM SERPL-SCNC: 4.7 MMOL/L — SIGNIFICANT CHANGE UP (ref 3.5–5)
RBC # BLD: 5.13 M/UL — SIGNIFICANT CHANGE UP (ref 4.7–6.1)
RBC # FLD: 12.8 % — SIGNIFICANT CHANGE UP (ref 11.5–14.5)
SODIUM SERPL-SCNC: 142 MMOL/L — SIGNIFICANT CHANGE UP (ref 135–146)
WBC # BLD: 6.74 K/UL — SIGNIFICANT CHANGE UP (ref 4.8–10.8)
WBC # FLD AUTO: 6.74 K/UL — SIGNIFICANT CHANGE UP (ref 4.8–10.8)

## 2023-02-10 PROCEDURE — 85027 COMPLETE CBC AUTOMATED: CPT

## 2023-02-10 PROCEDURE — 80048 BASIC METABOLIC PNL TOTAL CA: CPT

## 2023-02-10 PROCEDURE — 93571 IV DOP VEL&/PRESS C FLO 1ST: CPT | Mod: 26,LD

## 2023-02-10 PROCEDURE — C1894: CPT

## 2023-02-10 PROCEDURE — C1769: CPT

## 2023-02-10 PROCEDURE — 93458 L HRT ARTERY/VENTRICLE ANGIO: CPT

## 2023-02-10 PROCEDURE — 93458 L HRT ARTERY/VENTRICLE ANGIO: CPT | Mod: 26

## 2023-02-10 PROCEDURE — 36415 COLL VENOUS BLD VENIPUNCTURE: CPT

## 2023-02-10 PROCEDURE — 93571 IV DOP VEL&/PRESS C FLO 1ST: CPT | Mod: LD,52

## 2023-02-10 PROCEDURE — C1887: CPT

## 2023-02-10 NOTE — H&P CARDIOLOGY - NSICDXPASTMEDICALHX_GEN_ALL_CORE_FT
PAST MEDICAL HISTORY:  Anxiety     H/O aortic valve stenosis     High cholesterol     HTN (hypertension)     Obesity     JAVI on CPAP     Paroxysmal atrial fibrillation

## 2023-02-10 NOTE — H&P CARDIOLOGY - HISTORY OF PRESENT ILLNESS
Patient is a 67y Male PMH: JAVI on Cpap, HTN, HLD, Mod AS, PAF, anxiety, obesity                                 PSH: hernia repair    Pt reports episodes of severe SOB, had CCTA on 1/26/23 revealing Ca score 474 and mod-severe LAD stenosis, [facility] recommended    Vital Signs Last 24 Hrs  T(C): --  T(F): --  HR: --  BP: --  BP(mean): --  RR: --  SpO2: --        Pre cath note:  indication:  [ ] STEMI                [ ] NSTEMI                 [ ] Acute coronary syndrome                   [ ]Unstable Angina   [ ] high risk  [ ] intermediate risk  [ ] low risk                   [ ] Stable Angina     non-invasive testing:                          Date:                     result: [ ] high risk  [ ] intermediate risk  [ ] low risk    Anti- Anginal medications:                    [ ] not used                       [x ] used                   [ ] not used but strong indication not to use    Ejection Fraction                   [ ] <29            [ ] 30-39%   [ ] 40-49%     [ x]>50%    CHF                   [ ] active (within last 14 days on meds   [ ] Chronic (on meds but no exacerbation)    COPD                   [ ] mild (on chronic bronchodilators)  [ ] moderate (on chronic steroid therapy)      [ ] severe (indication for home O2 or PACO2 >50)    Other risk factors:                     [ ] Previous MI                     [ ] CVA/ stroke                    [ ] carotid stent/ CEA                    [ ] PVD/PAD- (arterial aneurysm, non-palpable pulses, tortuous vessel with inability to insert catheter, infra-renal dissection, renal or subclavian artery stenosis)                    [ ] diabetic                    [ ] previous CABG                    [ ] Renal Failure     Bleeding Risk: 1%    RIGHT RADIAL ARTERY EVALUATION:  CROW TEST: [] Negative          [] Positive  BARBEAU TEST: [] Class A           [] Class B           [] Class C            [] Class D    REVIEW OF SYSTEMS:  CONSTITUTIONAL: No fever, weight loss, or fatigue  CARDIOLOGY: PAtient denies chest pain, shortness of breath or syncopal episodes.   RESPIRATORY: + SOB  NEUROLOGICAL: NO weakness, no focal deficits to report.  GI: no BRBPR, no N,V,diarrhea.     PHYSICAL EXAM:  · CONSTITUTIONAL:	Well-developed, well nourished    ·RESPIRATORY:   airway patent; breath sounds equal; good air movement; respirations non-labored; clear to auscultation bilaterally; no chest wall tenderness; no intercostal retractions; no rales,rhonchi or wheeze  · CARDIOVASCULAR	regular rate and rhythm  no rub  no murmur  normal PMI  · EXTREMITIES: No cyanosis, clubbing or edema  · VASCULAR: 	Equal and normal pulses (carotid, femoral, dorsalis pedis)  	      EF: 57% on 6/6/22  EKG: SR 1 block on 2/2/23 Patient is a 67y Male PMH: JAVI on Cpap, HTN, HLD, Mod AS, PAF, anxiety, obesity                                 PSH: hernia repair    Pt reports episodes of severe SOB, had CCTA on 1/26/23 revealing Ca score 474 and mod-severe LAD stenosis, [facility] recommended    Vital Signs Last 24 Hrs  T(C): --  T(F): --  HR: --77  BP: --167/79  BP(mean): --117  RR: --  SpO2: --97% RA        Pre cath note:  indication:  [ ] STEMI                [ ] NSTEMI                 [ ] Acute coronary syndrome                   [ ]Unstable Angina   [ ] high risk  [ ] intermediate risk  [ ] low risk                   [ x] Stable Angina     non-invasive testing:    CCTA                      Date:      1/26/23               result: [x ] high risk  [ ] intermediate risk  [ ] low risk    Anti- Anginal medications:                    [ ] not used                       [x ] used (2nd not used d/t jose and dizziness)                  [ ] not used but strong indication not to use    Ejection Fraction                   [ ] <29            [ ] 30-39%   [ ] 40-49%     [ x]>50%    CHF                   [ ] active (within last 14 days on meds   [ ] Chronic (on meds but no exacerbation)    COPD                   [ ] mild (on chronic bronchodilators)  [ ] moderate (on chronic steroid therapy)      [ ] severe (indication for home O2 or PACO2 >50)    Other risk factors:                     [ ] Previous MI                     [ ] CVA/ stroke                    [ ] carotid stent/ CEA                    [ ] PVD/PAD- (arterial aneurysm, non-palpable pulses, tortuous vessel with inability to insert catheter, infra-renal dissection, renal or subclavian artery stenosis)                    [ ] diabetic                    [ ] previous CABG                    [ ] Renal Failure     Bleeding Risk: 1%    RIGHT RADIAL ARTERY EVALUATION:  CROW TEST: n/l    REVIEW OF SYSTEMS:  CONSTITUTIONAL: No fever, weight loss, + fatigue and dizziness at times  CARDIOLOGY: PAtient denies chest pain, or syncopal episodes.   RESPIRATORY: + SOB  NEUROLOGICAL: NO weakness, no focal deficits to report.  GI: no BRBPR, no N,V,diarrhea.     PHYSICAL EXAM:  · CONSTITUTIONAL:	Well-developed, well nourished    ·RESPIRATORY:   airway patent; breath sounds equal; good air movement; respirations non-labored; clear to auscultation bilaterally; no chest wall tenderness; no intercostal retractions; no rales,rhonchi or wheeze  · CARDIOVASCULAR	regular rate and rhythm  no rub  + murmur  normal PMI  · EXTREMITIES: No cyanosis, clubbing or edema  · VASCULAR: 	Equal and normal pulses (carotid, femoral, dorsalis pedis)  	      EF: 57% on 6/6/22  EKG: SR 1 block on 2/2/23

## 2023-02-10 NOTE — ASU PATIENT PROFILE, ADULT - FALL HARM RISK - UNIVERSAL INTERVENTIONS
Bed in lowest position, wheels locked, appropriate side rails in place/Call bell, personal items and telephone in reach/Instruct patient to call for assistance before getting out of bed or chair/Non-slip footwear when patient is out of bed/[place] to call system/Physically safe environment - no spills, clutter or unnecessary equipment/Purposeful Proactive Rounding/Room/bathroom lighting operational, light cord in reach

## 2023-02-10 NOTE — CHART NOTE - NSCHARTNOTEFT_GEN_A_CORE
PRE-OP DIAGNOSIS:    stable angina, abnormal ccta    PROCEDURE:     [x] Coronary Angiogram     [x] LHC     [] LVG     [] RHC     [x] Intervention (see below)         PHYSICIAN:  Dr. Cano    ASSISTANT:  Dr. Eledr       PROCEDURE DESCRIPTION:     Consent:      [x] Patient     [] Family Member     []  Used        Anesthesia:     [] General     [x] Sedation     [x] Local        Access & Closure:     [x] 6 Fr right Radial Artery (D-stat)    [] Fr Femoral Artery     [] Fr Femoral Vein     [] Fr Brachial Vein       IV Contrast: 70 mL        Intervention:    - Successful iFR of mid LAD showing non-hemodynamically significant lesion (iFR 0.95)      Implants: none       FINDINGS:     Coronary Dominance:  co-dominant    LM: minor luminal irregularities    LAD: mild disease, 50% mid LAD stenosis    CX: mild disease  LPL: mild disease    RCA: mild disease  RPDA: mild disease       LVEDP: 14 mmHg     EF: 60%        ESTIMATED BLOOD LOSS: < 10 mL        CONDITION:     [x] Good     [] Fair     [] Critical        SPECIMEN REMOVED: N/A       POST-OP DIAGNOSIS:      [] Normal Coronary Angiogram     [x] Mild Coronary Artery Disease (< 50% stenosis)     [] __ Vessel Coronary Artery Disease        PLAN OF CARE:     [x] D/C Home Today     [x] Medications:   - Resume Xarelto tonight if access site appears okay  - continue with statin, amlodipine    [x] IV Fluids: NS@100cc/hr x 3 hours    F/U with Cardiologist as outpatient

## 2023-02-13 DIAGNOSIS — I25.118 ATHEROSCLEROTIC HEART DISEASE OF NATIVE CORONARY ARTERY WITH OTHER FORMS OF ANGINA PECTORIS: ICD-10-CM

## 2023-02-13 DIAGNOSIS — R94.39 ABNORMAL RESULT OF OTHER CARDIOVASCULAR FUNCTION STUDY: ICD-10-CM

## 2023-03-09 PROBLEM — G47.33 OBSTRUCTIVE SLEEP APNEA (ADULT) (PEDIATRIC): Chronic | Status: ACTIVE | Noted: 2023-02-10

## 2023-03-09 PROBLEM — Z86.79 PERSONAL HISTORY OF OTHER DISEASES OF THE CIRCULATORY SYSTEM: Chronic | Status: ACTIVE | Noted: 2023-02-10

## 2023-03-09 PROBLEM — F41.9 ANXIETY DISORDER, UNSPECIFIED: Chronic | Status: ACTIVE | Noted: 2023-02-10

## 2023-03-09 PROBLEM — E66.9 OBESITY, UNSPECIFIED: Chronic | Status: ACTIVE | Noted: 2023-02-10

## 2023-03-09 PROBLEM — I48.0 PAROXYSMAL ATRIAL FIBRILLATION: Chronic | Status: ACTIVE | Noted: 2023-02-10

## 2023-03-13 ENCOUNTER — APPOINTMENT (OUTPATIENT)
Dept: ELECTROPHYSIOLOGY | Facility: CLINIC | Age: 68
End: 2023-03-13
Payer: MEDICARE

## 2023-03-13 VITALS
WEIGHT: 195 LBS | RESPIRATION RATE: 16 BRPM | BODY MASS INDEX: 30.61 KG/M2 | TEMPERATURE: 97.1 F | HEART RATE: 66 BPM | HEIGHT: 67 IN | SYSTOLIC BLOOD PRESSURE: 140 MMHG | DIASTOLIC BLOOD PRESSURE: 90 MMHG

## 2023-03-13 DIAGNOSIS — R42 DIZZINESS AND GIDDINESS: ICD-10-CM

## 2023-03-13 DIAGNOSIS — E78.00 PURE HYPERCHOLESTEROLEMIA, UNSPECIFIED: ICD-10-CM

## 2023-03-13 PROCEDURE — 93000 ELECTROCARDIOGRAM COMPLETE: CPT

## 2023-03-13 PROCEDURE — 99215 OFFICE O/P EST HI 40 MIN: CPT | Mod: 25

## 2023-03-13 RX ORDER — METOPROLOL SUCCINATE 50 MG/1
50 TABLET, EXTENDED RELEASE ORAL DAILY
Refills: 0 | Status: COMPLETED | COMMUNITY
End: 2023-03-13

## 2023-03-13 NOTE — DISCUSSION/SUMMARY
[FreeTextEntry1] : Mr. Edvin Ribeiro is a pleasant 67-year-old man with hypertension, hyperlipidemia, obstructive sleep apnea on CPAP, non-obstructive CAD, Obesity with a BMI of 30.5, moderate aortic stenosis, paroxysmal atrial fibrillation, atrial flutter, sinus pauses, and tachy-jose syndrome. Patient was diagnosed with atrial flutter in October 2022 and atrial fibrillation in November 2022. He was on Metoprolol for rate control, and he is on Xarelto. Metoprolol was stopped due to pauses.\par \par I discussed with patient the management strategy for atrial fibrillation at length, including medical therapy with calcium channel blocker and beta blocker, antiarrhythmics therapy, and catheter ablation. I also discussed stroke prevention at length. I recommend continuing the same medication as patient is rate controlled at metoprolol 50. I also recommend continuing Xarelto for stroke prevention.\par \par In view of his dizziness, patient might be having a period of bradycardia. I recommend a four-week MCOT to correlate between his dizziness, lightheadedness, and feeling fainty and any Bradyarrhythmias secondary to medications. Other possibilities for his dizziness are pauses. \par \par I reviewed result of MCOT with patient and family. \par \par I recommend dual PPM for the treatment of Tachy-jose syndrome with pauses limiting use of Metoprolol and Antiarrhythmics needed for AF.\par \par I am recommending a dual chamber pacemaker implantation for the treatment of the patient's condition. We discussed the nature of procedure, risks, alternatives and follow up care after device is implanted, including remote monitoring. We discussed the risks of the procedure including but not limited to bleeding, hematoma, injury to vessels and heart, perforation, tamponade, cardiac arrest, stroke, need for surgery, pneumothorax, infection and device malfunction. Patient expressed understanding of the discussion. I answered all the questions in details. Patient agreed with proceeding with the device implant. My  will contact patient with date and instruction prior to the procedure.\par \par Patient will follow with me in 4-6 weeks' time or earlier if symptoms develop or worsen. Please do not hesitate to contact me at [phone] if you have any further questions regarding this patient’s care.\par \par

## 2023-03-13 NOTE — CARDIOLOGY SUMMARY
[de-identified] : (3/13/2023)/sinus rhythm at 66 bpm, LVH, 1st degree AV block with  ms, no significant STT wave abnormalities. \par (12/9/2022)/ Sinus Rhythm at 57 bpm, LVH, 1st degree AV block with  ms, no significant STT wave abnormalities. \par (11/14/2022)/ Coarse Atrial fibrillation at 58 bpm, possibly atrial flutter. \par (10/11/2022)/ Typical atrial flutter, with 4:1 AV conduction and ventricular rate at 58 bpm. [de-identified] : (Dec 2022) MCOT: multiple pauses longest 4.3 sec in sinus on 1/9/2023 noon time. Total pauses 17 pauses > 2 sec. AF burden 5% [de-identified] :  (6/6/2022)/ 2D echo LV size normal, LV EF 57%, LA mildly dilated, mild-moderate valve stenosis, mild MR, mild TR, no other abnormalities. \par (10/26/2020)/ 2D echo mild LVH, normal LV EF, LA size is normal, mild-moderate aortic stenosis, trace MR, trace TR, no other abnormalities. [de-identified] : (Feb 2023) cardiac cath: non-obstructive CAD

## 2023-03-13 NOTE — REASON FOR VISIT
[Arrhythmia/ECG Abnorrmalities] : arrhythmia/ECG abnormalities [FreeTextEntry1] : Daughter Desiree is an RN at [employer]. [FreeTextEntry3] : Dr. Madi Franklin - Dr. Beena Cano

## 2023-03-13 NOTE — HISTORY OF PRESENT ILLNESS
[FreeTextEntry1] : Hypertension, Hyperlipidemia, Moderate Aortic Stenosis, Obstructive Sleep Apnea on CPAP, non-obstructive CAD, Obesity with a BMI of 30.5, new onset Paroxysmal Atrial Fibrillation, Atrial Flutter, sinus pauses, tachy-jose syndrome.\par \par Patient was diagnosed with an atrial flutter on a routine ECG with his primary care doctor in October 2022. He saw Dr. Cano on 11/14/2022, and ECG showed atrial fibrillation. He is being treated with Metoprolol for rate control and Xarelto for stroke prevention. Patient has no bleeding on Xarelto. He reports feeling tired and short of breath when he has afib. He also reports feeling dizzy and fainty without having any real syncope. He had dizziness for a long time, but in the recent few weeks, the dizziness got worse.\par \par Patient was seen in Dec 2022 and given MCOT: multiple pauses longest 4.3 sec in sinus on 1/9/2023 noon time. Total pauses 17 pauses > 2 sec. AF burden 5%.\par \par Patient continue to have intermittent dizziness. He has no chest pain, no shortness of breath, no dyspnea on exertion, no orthopnea, no PND. He denies syncope. He has no exertional symptoms. He presents for evaluation.\par \par He presents today in sinus rhythm. \par \par He is here to discuss the management of atrial fibrillation and tachy-jose syndrome.

## 2023-03-16 ENCOUNTER — OUTPATIENT (OUTPATIENT)
Dept: OUTPATIENT SERVICES | Facility: HOSPITAL | Age: 68
LOS: 1 days | End: 2023-03-16
Payer: MEDICARE

## 2023-03-16 VITALS
SYSTOLIC BLOOD PRESSURE: 158 MMHG | WEIGHT: 195.11 LBS | RESPIRATION RATE: 18 BRPM | HEIGHT: 67 IN | DIASTOLIC BLOOD PRESSURE: 90 MMHG | TEMPERATURE: 98 F | HEART RATE: 74 BPM | OXYGEN SATURATION: 100 %

## 2023-03-16 DIAGNOSIS — Z98.890 OTHER SPECIFIED POSTPROCEDURAL STATES: Chronic | ICD-10-CM

## 2023-03-16 DIAGNOSIS — I49.5 SICK SINUS SYNDROME: ICD-10-CM

## 2023-03-16 DIAGNOSIS — Z01.818 ENCOUNTER FOR OTHER PREPROCEDURAL EXAMINATION: ICD-10-CM

## 2023-03-16 LAB
ALBUMIN SERPL ELPH-MCNC: 4.8 G/DL — SIGNIFICANT CHANGE UP (ref 3.5–5.2)
ALP SERPL-CCNC: 93 U/L — SIGNIFICANT CHANGE UP (ref 30–115)
ALT FLD-CCNC: 15 U/L — SIGNIFICANT CHANGE UP (ref 0–41)
ANION GAP SERPL CALC-SCNC: 14 MMOL/L — SIGNIFICANT CHANGE UP (ref 7–14)
APTT BLD: 30.4 SEC — SIGNIFICANT CHANGE UP (ref 27–39.2)
AST SERPL-CCNC: 20 U/L — SIGNIFICANT CHANGE UP (ref 0–41)
BASOPHILS # BLD AUTO: 0.06 K/UL — SIGNIFICANT CHANGE UP (ref 0–0.2)
BASOPHILS NFR BLD AUTO: 0.7 % — SIGNIFICANT CHANGE UP (ref 0–1)
BILIRUB SERPL-MCNC: 0.4 MG/DL — SIGNIFICANT CHANGE UP (ref 0.2–1.2)
BUN SERPL-MCNC: 22 MG/DL — HIGH (ref 10–20)
CALCIUM SERPL-MCNC: 10.1 MG/DL — SIGNIFICANT CHANGE UP (ref 8.4–10.5)
CHLORIDE SERPL-SCNC: 100 MMOL/L — SIGNIFICANT CHANGE UP (ref 98–110)
CO2 SERPL-SCNC: 27 MMOL/L — SIGNIFICANT CHANGE UP (ref 17–32)
CREAT SERPL-MCNC: 1 MG/DL — SIGNIFICANT CHANGE UP (ref 0.7–1.5)
EGFR: 81 ML/MIN/1.73M2 — SIGNIFICANT CHANGE UP
EOSINOPHIL # BLD AUTO: 0.27 K/UL — SIGNIFICANT CHANGE UP (ref 0–0.7)
EOSINOPHIL NFR BLD AUTO: 3.1 % — SIGNIFICANT CHANGE UP (ref 0–8)
GLUCOSE SERPL-MCNC: 122 MG/DL — HIGH (ref 70–99)
HCT VFR BLD CALC: 46.8 % — SIGNIFICANT CHANGE UP (ref 42–52)
HGB BLD-MCNC: 15.9 G/DL — SIGNIFICANT CHANGE UP (ref 14–18)
IMM GRANULOCYTES NFR BLD AUTO: 0.3 % — SIGNIFICANT CHANGE UP (ref 0.1–0.3)
INR BLD: 1.08 RATIO — SIGNIFICANT CHANGE UP (ref 0.65–1.3)
LYMPHOCYTES # BLD AUTO: 2.54 K/UL — SIGNIFICANT CHANGE UP (ref 1.2–3.4)
LYMPHOCYTES # BLD AUTO: 29 % — SIGNIFICANT CHANGE UP (ref 20.5–51.1)
MCHC RBC-ENTMCNC: 30.1 PG — SIGNIFICANT CHANGE UP (ref 27–31)
MCHC RBC-ENTMCNC: 34 G/DL — SIGNIFICANT CHANGE UP (ref 32–37)
MCV RBC AUTO: 88.6 FL — SIGNIFICANT CHANGE UP (ref 80–94)
MONOCYTES # BLD AUTO: 0.7 K/UL — HIGH (ref 0.1–0.6)
MONOCYTES NFR BLD AUTO: 8 % — SIGNIFICANT CHANGE UP (ref 1.7–9.3)
NEUTROPHILS # BLD AUTO: 5.15 K/UL — SIGNIFICANT CHANGE UP (ref 1.4–6.5)
NEUTROPHILS NFR BLD AUTO: 58.9 % — SIGNIFICANT CHANGE UP (ref 42.2–75.2)
NRBC # BLD: 0 /100 WBCS — SIGNIFICANT CHANGE UP (ref 0–0)
PLATELET # BLD AUTO: 176 K/UL — SIGNIFICANT CHANGE UP (ref 130–400)
POTASSIUM SERPL-MCNC: 3.9 MMOL/L — SIGNIFICANT CHANGE UP (ref 3.5–5)
POTASSIUM SERPL-SCNC: 3.9 MMOL/L — SIGNIFICANT CHANGE UP (ref 3.5–5)
PROT SERPL-MCNC: 7.6 G/DL — SIGNIFICANT CHANGE UP (ref 6–8)
PROTHROM AB SERPL-ACNC: 12.3 SEC — SIGNIFICANT CHANGE UP (ref 9.95–12.87)
RBC # BLD: 5.28 M/UL — SIGNIFICANT CHANGE UP (ref 4.7–6.1)
RBC # FLD: 12.8 % — SIGNIFICANT CHANGE UP (ref 11.5–14.5)
SODIUM SERPL-SCNC: 141 MMOL/L — SIGNIFICANT CHANGE UP (ref 135–146)
WBC # BLD: 8.75 K/UL — SIGNIFICANT CHANGE UP (ref 4.8–10.8)
WBC # FLD AUTO: 8.75 K/UL — SIGNIFICANT CHANGE UP (ref 4.8–10.8)

## 2023-03-16 PROCEDURE — 93010 ELECTROCARDIOGRAM REPORT: CPT

## 2023-03-16 PROCEDURE — 87640 STAPH A DNA AMP PROBE: CPT

## 2023-03-16 PROCEDURE — 81001 URINALYSIS AUTO W/SCOPE: CPT

## 2023-03-16 PROCEDURE — 85025 COMPLETE CBC W/AUTO DIFF WBC: CPT

## 2023-03-16 PROCEDURE — 85730 THROMBOPLASTIN TIME PARTIAL: CPT

## 2023-03-16 PROCEDURE — 99214 OFFICE O/P EST MOD 30 MIN: CPT | Mod: 25

## 2023-03-16 PROCEDURE — 87086 URINE CULTURE/COLONY COUNT: CPT

## 2023-03-16 PROCEDURE — 85610 PROTHROMBIN TIME: CPT

## 2023-03-16 PROCEDURE — 87641 MR-STAPH DNA AMP PROBE: CPT

## 2023-03-16 PROCEDURE — 36415 COLL VENOUS BLD VENIPUNCTURE: CPT

## 2023-03-16 PROCEDURE — 80053 COMPREHEN METABOLIC PANEL: CPT

## 2023-03-16 PROCEDURE — 93005 ELECTROCARDIOGRAM TRACING: CPT

## 2023-03-16 NOTE — H&P PST ADULT - HISTORY OF PRESENT ILLNESS
Patient is a 67 year old male presenting to PAST in preparation for PPM DC IMPLANT  on 3/23  under sedation anesthesia by Dr. Pierce  reports h/o low EF  and periods of bradycardia has been advised to have above  PATIENT CURRENTLY DENIES CHEST PAIN  SHORTNESS OF BREATH  PALPITATIONS,  DYSURIA, OR UPPER RESPIRATORY INFECTION IN PAST 2 WEEKS  EXERCISE  TOLERANCE  1-2 FLIGHT OF STAIRS  WITHOUT SHORTNESS OF BREATH    Anesthesia Alert  NO--Difficult Airway  NO--History of neck surgery or radiation  NO--Limited ROM of neck  NO--History of Malignant hyperthermia  NO--Personal or family history of Pseudocholinesterase deficiency  NO--Prior Anesthesia Complication  NO--Latex Allergy  NO--Loose teeth  NO--History of Rheumatoid Arthritis  NO--JAVI  NO-- BLEEDING RISK  NO--Other_____    As per patient, this is their complete medical and surgical history, including medications both prescribed or over the counter.  Patient verbalized understanding of instructions and was given the opportunity to ask questions and have them answered.  Patient denies any signs or symptoms of COVID 19 and denies contact with known positive individuals.  They have an appointment for  COVID testing pre-procedure and acknowledge its time and place.  They were instructed to quarantine pre-procedure, practice exposure control measures, continue to self-monitor and report any concerns to their proceduralist.

## 2023-03-16 NOTE — H&P PST ADULT - NSICDXPASTMEDICALHX_GEN_ALL_CORE_FT
PAST MEDICAL HISTORY:  Anxiety     Glaucoma     H/O aortic valve stenosis     High cholesterol     HTN (hypertension)     Obesity     JAVI on CPAP     Paroxysmal atrial fibrillation

## 2023-03-17 DIAGNOSIS — I49.5 SICK SINUS SYNDROME: ICD-10-CM

## 2023-03-17 DIAGNOSIS — Z01.818 ENCOUNTER FOR OTHER PREPROCEDURAL EXAMINATION: ICD-10-CM

## 2023-03-17 LAB
APPEARANCE UR: CLEAR — SIGNIFICANT CHANGE UP
BACTERIA # UR AUTO: NEGATIVE — SIGNIFICANT CHANGE UP
BILIRUB UR-MCNC: NEGATIVE — SIGNIFICANT CHANGE UP
COLOR SPEC: SIGNIFICANT CHANGE UP
DIFF PNL FLD: ABNORMAL
EPI CELLS # UR: 0 /HPF — SIGNIFICANT CHANGE UP (ref 0–5)
GLUCOSE UR QL: NEGATIVE — SIGNIFICANT CHANGE UP
HYALINE CASTS # UR AUTO: 0 /LPF — SIGNIFICANT CHANGE UP (ref 0–7)
KETONES UR-MCNC: NEGATIVE — SIGNIFICANT CHANGE UP
LEUKOCYTE ESTERASE UR-ACNC: NEGATIVE — SIGNIFICANT CHANGE UP
MRSA PCR RESULT.: NEGATIVE — SIGNIFICANT CHANGE UP
NITRITE UR-MCNC: NEGATIVE — SIGNIFICANT CHANGE UP
PH UR: 6 — SIGNIFICANT CHANGE UP (ref 5–8)
PROT UR-MCNC: SIGNIFICANT CHANGE UP
RBC CASTS # UR COMP ASSIST: 3 /HPF — SIGNIFICANT CHANGE UP (ref 0–4)
SP GR SPEC: 1.02 — SIGNIFICANT CHANGE UP (ref 1.01–1.02)
UROBILINOGEN FLD QL: SIGNIFICANT CHANGE UP
WBC UR QL: 0 /HPF — SIGNIFICANT CHANGE UP (ref 0–5)

## 2023-03-18 LAB
CULTURE RESULTS: SIGNIFICANT CHANGE UP
SPECIMEN SOURCE: SIGNIFICANT CHANGE UP

## 2023-03-20 ENCOUNTER — LABORATORY RESULT (OUTPATIENT)
Age: 68
End: 2023-03-20

## 2023-03-23 ENCOUNTER — INPATIENT (INPATIENT)
Facility: HOSPITAL | Age: 68
LOS: 0 days | Discharge: HOME | DRG: 243 | End: 2023-03-24
Attending: INTERNAL MEDICINE | Admitting: STUDENT IN AN ORGANIZED HEALTH CARE EDUCATION/TRAINING PROGRAM
Payer: MEDICARE

## 2023-03-23 ENCOUNTER — TRANSCRIPTION ENCOUNTER (OUTPATIENT)
Age: 68
End: 2023-03-23

## 2023-03-23 ENCOUNTER — APPOINTMENT (OUTPATIENT)
Dept: ELECTROPHYSIOLOGY | Facility: HOSPITAL | Age: 68
End: 2023-03-23

## 2023-03-23 VITALS
HEART RATE: 79 BPM | WEIGHT: 194.89 LBS | HEIGHT: 66.93 IN | RESPIRATION RATE: 16 BRPM | SYSTOLIC BLOOD PRESSURE: 151 MMHG | OXYGEN SATURATION: 98 % | DIASTOLIC BLOOD PRESSURE: 93 MMHG

## 2023-03-23 DIAGNOSIS — I49.5 SICK SINUS SYNDROME: ICD-10-CM

## 2023-03-23 DIAGNOSIS — Z98.890 OTHER SPECIFIED POSTPROCEDURAL STATES: Chronic | ICD-10-CM

## 2023-03-23 PROCEDURE — 71046 X-RAY EXAM CHEST 2 VIEWS: CPT

## 2023-03-23 PROCEDURE — 33208 INSRT HEART PM ATRIAL & VENT: CPT | Mod: KX

## 2023-03-23 PROCEDURE — C1898: CPT

## 2023-03-23 PROCEDURE — C1785: CPT

## 2023-03-23 PROCEDURE — C1769: CPT

## 2023-03-23 PROCEDURE — 93280 PM DEVICE PROGR EVAL DUAL: CPT

## 2023-03-23 PROCEDURE — 93005 ELECTROCARDIOGRAM TRACING: CPT

## 2023-03-23 PROCEDURE — C1892: CPT

## 2023-03-23 PROCEDURE — C1889: CPT

## 2023-03-23 RX ORDER — AMLODIPINE BESYLATE 2.5 MG/1
10 TABLET ORAL ONCE
Refills: 0 | Status: DISCONTINUED | OUTPATIENT
Start: 2023-03-23 | End: 2023-03-24

## 2023-03-23 RX ORDER — CEFAZOLIN SODIUM 1 G
1000 VIAL (EA) INJECTION ONCE
Refills: 0 | Status: DISCONTINUED | OUTPATIENT
Start: 2023-03-23 | End: 2023-03-23

## 2023-03-23 RX ORDER — TIMOLOL 0.5 %
1 DROPS OPHTHALMIC (EYE)
Refills: 0 | Status: DISCONTINUED | OUTPATIENT
Start: 2023-03-23 | End: 2023-03-23

## 2023-03-23 RX ORDER — LATANOPROST 0.05 MG/ML
1 SOLUTION/ DROPS OPHTHALMIC; TOPICAL AT BEDTIME
Refills: 0 | Status: DISCONTINUED | OUTPATIENT
Start: 2023-03-23 | End: 2023-03-24

## 2023-03-23 RX ORDER — ATORVASTATIN CALCIUM 80 MG/1
10 TABLET, FILM COATED ORAL ONCE
Refills: 0 | Status: DISCONTINUED | OUTPATIENT
Start: 2023-03-23 | End: 2023-03-24

## 2023-03-23 RX ORDER — CEFAZOLIN SODIUM 1 G
1000 VIAL (EA) INJECTION ONCE
Refills: 0 | Status: COMPLETED | OUTPATIENT
Start: 2023-03-23 | End: 2023-03-23

## 2023-03-23 RX ORDER — CEFAZOLIN SODIUM 1 G
VIAL (EA) INJECTION
Refills: 0 | Status: DISCONTINUED | OUTPATIENT
Start: 2023-03-23 | End: 2023-03-24

## 2023-03-23 RX ORDER — CEFAZOLIN SODIUM 1 G
1000 VIAL (EA) INJECTION EVERY 8 HOURS
Refills: 0 | Status: DISCONTINUED | OUTPATIENT
Start: 2023-03-24 | End: 2023-03-24

## 2023-03-23 RX ORDER — CEFAZOLIN SODIUM 1 G
2000 VIAL (EA) INJECTION ONCE
Refills: 0 | Status: COMPLETED | OUTPATIENT
Start: 2023-03-23 | End: 2023-03-23

## 2023-03-23 RX ADMIN — LATANOPROST 1 DROP(S): 0.05 SOLUTION/ DROPS OPHTHALMIC; TOPICAL at 22:39

## 2023-03-23 RX ADMIN — Medication 100 MILLIGRAM(S): at 10:43

## 2023-03-23 RX ADMIN — Medication 100 MILLIGRAM(S): at 08:52

## 2023-03-23 RX ADMIN — Medication 100 MILLIGRAM(S): at 19:47

## 2023-03-23 NOTE — DISCHARGE NOTE PROVIDER - ATTENDING DISCHARGE PHYSICAL EXAMINATION:
Physical Exam  T(C): 37.4 (03-24-23 @ 07:18), Max: 38.4 (03-23-23 @ 23:42)  HR: 83 (03-24-23 @ 07:18) (60 - 84)  BP: 149/89 (03-24-23 @ 07:18) (109/60 - 153/83)  RR: 24 (03-24-23 @ 07:18) (20 - 24)  SpO2: 95% (03-24-23 @ 07:18) (95% - 98%)  Gen: NAD  CV: RRR, nl S1 and S2, no m/r/g, no LE edema, no JVD  Pulm: CTAB, no crackles  GI: soft, nontender  MSK: normal ROM  Extremities: warm  Neuro: A+Ox3  Psych: cooperative

## 2023-03-23 NOTE — DISCHARGE NOTE PROVIDER - PROVIDER TOKENS
PROVIDER:[TOKEN:[[id number]],FOLLOWUP:[1 month],ESTABLISHEDPATIENT:[T]] PROVIDER:[TOKEN:[[id number]],SCHEDULEDAPPT:[04/18/2023],SCHEDULEDAPPTTIME:[11:00 AM],ESTABLISHEDPATIENT:[T]] PROVIDER:[TOKEN:[[id number]],SCHEDULEDAPPT:[04/18/2023],SCHEDULEDAPPTTIME:[11:00 AM],ESTABLISHEDPATIENT:[T]],PROVIDER:[TOKEN:[[id number]],FOLLOWUP:[Routine]]

## 2023-03-23 NOTE — CHART NOTE - NSCHARTNOTEFT_GEN_A_CORE
Electrophysiology Brief Post-Operative Note    I have personally seen and examined the patient.  I agree with the history and physical which I have reviewed and noted any changes below.      DEVICE COMPANY:  -[employer]      PRE-OP DIAGNOSIS:   Sick Sinus Syndrome/sinus pauses    POST-OP DIAGNOSIS:   Sick Sinus Syndrome/sinus pauses    PROCEDURE:  Permanent Pacemaker Implant    Physician: MARIA C Pierce MD  Assistant: None    ANESTHESIA TYPE:  [  ]General Anesthesia  [X] Sedation  [X] Local/Regional    CONDITION  [  ] Critical  [  ] Serious  [  ]Fair  [X]Good    SPECIMENS REMOVED (IF APPLICABLE): None    IMPLANTS (IF APPLICABLE)  Dual Chamber Pacemker Implant      FINDINGS (see below)   -Successful Pacemaker implant   -No immediate complications   -Estimated Blood Loss: 20  mL   -Contrast used: none    PLAN OF CARE  - Ancef 1g IVq8 h for 3 doses  - Vancomyocin 1g IV q12 h for 2 doses  - Chest X-ray portable now  - Chest X-ray PA/Lat tomorrow at 6am  - Device interrogation tomorrow in am  - Discontinue Heparin, Lovenox, and NOACS for 48 hours  - No anticoagulation unless discussed with EP attending      FOLLOW-UP  -Outpatient follow-up with Electrophysiology in 3-4 weeks     [address] [place] [address] ([phone])[facility] [phone]
I saw and examined patient and I reviewed his chart and blood work. I attest that there has been no clinical change in patient's condition since last assessment documented in H&P, consult, or last office visit.

## 2023-03-23 NOTE — DISCHARGE NOTE PROVIDER - CARE PROVIDERS DIRECT ADDRESSES
,[email or web address],DirectAddress_Unknown

## 2023-03-23 NOTE — DISCHARGE NOTE PROVIDER - NSDCFUSCHEDAPPT_GEN_ALL_CORE_FT
[facility]  [facility] [address] [facility]  Scheduled Appointment: 04/18/2023

## 2023-03-23 NOTE — PACU DISCHARGE NOTE - NS MD DISCHARGE NOTE DISCHARGE
Cardiac Tele [Mother] : mother [Cereal] : cereal [Baby food] : baby food [Peanut] : peanut [Normal] : Normal [___ voids per day] : [unfilled] voids per day [In crib] : In crib [Wakes up at night] : Wakes up at night [Rear facing car seat in  back seat] : Rear facing car seat in  back seat [Up to date] : Up to date [de-identified] : Formula 4-5 ounces every 3-4 hours [FreeTextEntry1] : Sonya : [id number]\par Lily is a 9 month old ex full term female otherwise healthy who presents for well check. Mom reports runny nose and cough for past week, but no fever. Born in [place]. NBS negative for beta thalassemia minor.\par \par

## 2023-03-23 NOTE — DISCHARGE NOTE PROVIDER - NSDCCPCAREPLAN_GEN_ALL_CORE_FT
PRINCIPAL DISCHARGE DIAGNOSIS  Diagnosis: Cardiac pacemaker  Assessment and Plan of Treatment: Your Pacemaker site is covered with Steri-strips. These should fall off in 2 weeks, if not you can gently pull them off after that time. Call your Electrophysiologist if your site is red, swollen, or has drainage, if you develop fever, or if your arm becomes swollen. You may shower tomorrow, let soap and water run over the area but do not scrub. No baths, swimming, strenuous activity, or driving for 1 month. Do not raise your Left arm above the level of your shoulder for 1 month.   -Please f/u with Dr. Pierce in 2-3 weeks      SECONDARY DISCHARGE DIAGNOSES  Diagnosis: Paroxysmal atrial fibrillation  Assessment and Plan of Treatment: Please resume your home Xarelto on _____    Diagnosis: HTN (hypertension)  Assessment and Plan of Treatment: Please continue your home Amlodipine 10mg daily and Hydrochlorothiazide 12.5mg daily     PRINCIPAL DISCHARGE DIAGNOSIS  Diagnosis: Cardiac pacemaker  Assessment and Plan of Treatment: - You should restart your Xarelto  on sunday, 3/26th)   - Do not shower for 5 days .  - Do not drive or operate heavy machinery for 1 week  - Do not submerge in water (example: baths, swimming) for 1 month.  - Do not lift your left arm greater than shoulder height for 6 weeks.  - Do not lift anything heavier than 5-10 lbs with your left arm for 6 weeks.  - Any sudden swelling, redness, fever, discharge, or severe pain, call the Electrophysiology Office at [phone].        SECONDARY DISCHARGE DIAGNOSES  Diagnosis: Paroxysmal atrial fibrillation  Assessment and Plan of Treatment: Please resume your home Xarelto on on sunday, 3/26th)    Diagnosis: HTN (hypertension)  Assessment and Plan of Treatment: Please continue your home Amlodipine 10mg daily and Hydrochlorothiazide 12.5mg daily

## 2023-03-23 NOTE — DISCHARGE NOTE PROVIDER - HOSPITAL COURSE
INCOMPLETE    68 yo Male with a PMH of HTN, HLD, Moderate AS, pAFib (on Xarelto), anxiety, JAVI (uses CPAP), non-obstructive CAD (mLAD 50%, iFR negative 2/2023) who presented for elective PPM implantation.     S/p ____ PPM implant with Dr. Pierce on 3/23/23. Resumed Xarelto on _____.    Patient seen and examined at the bedside. No complaints at this time. HD stable, AM labs WNL. No overnight events on tele. AM interrogation WNL, CXR stable. As per Dr. Salazar, patient is stable for discharge home on Xarelto 20mg QD, HCTZ 12.5mg QD, Amlodipine 10mg QD, Atorvastatin 10mg QD with instruction to follow up with Dr. Pierce in 2-3 weeks.            Patient is a 66 yo Male with a PMH of HTN, HLD, Moderate AS,  anxiety, JAVI (uses CPAP), non-obstructive CAD (mLAD 50%, iFR negative 2/2023), new onset Paroxysmal A/Fibrillation/ AFlutter (on Xarelto). He was evaluated outpatient by Dr. Gómez for sinus pauses, tachy-jose syndrome. MCOT shows multiple pauses longest 4.3 sec in sinus on 1/9/2023. Total pauses 17 pauses > 2 sec. AF burden 5%. Patient now presented for elective PPM implant.   On 3/23, patient underwent Dual chamber implant by Dr. Pierce. POD 0, patient was monitored on cardiology telemetry overnight.   POD1, Patient seen and examined at the bedside. No complaints at this time. HD stable, AM labs WNL. No overnight events on tele. AM interrogation WNL, CXR stable. Patient is stable for discharge home on Xarelto 20mg QD ( resume on sunday , 3/26th), HCTZ 12.5mg QD, Amlodipine 10mg QD, Atorvastatin 10mg QD with instruction to follow up with Dr. Pierce in 2-3 weeks.          Patient is a 66 yo Male with a PMH of HTN, HLD, Moderate AS,  anxiety, JAVI (uses CPAP), non-obstructive CAD (mLAD 50%, iFR negative 2/2023), new onset Paroxysmal A/Fibrillation/ AFlutter (on Xarelto). He was evaluated outpatient by Dr. Gómez for sinus pauses, tachy-jose syndrome. MCOT shows multiple pauses longest 4.3 sec in sinus on 1/9/2023. Total pauses 17 pauses > 2 sec. AF burden 5%. Patient now presented for elective PPM implant.   On 3/23, patient underwent Dual chamber implant by Dr. Pierce. POD 0, patient was monitored on cardiology telemetry overnight.   POD1, Patient seen and examined at the bedside. No complaints at this time. HD stable, AM labs WNL. No overnight events on tele. AM interrogation WNL, CXR stable. Patient is stable for discharge home on Xarelto 20mg QD ( resume on sunday , 3/26th), HCTZ 12.5mg QD, Amlodipine 10mg QD, Atorvastatin 10mg QD with instruction to follow up with Dr. Pierce in 2-3 weeks. As per Dr. Pierce okay to resume home dose of metoprolol succinate 50mg.          Patient is a 68 yo Male with a PMH of HTN, HLD, Moderate AS,  anxiety, JAVI (uses CPAP), non-obstructive CAD (mLAD 50%, iFR negative 2/2023), new onset Paroxysmal A/Fibrillation/ AFlutter (on Xarelto). He was evaluated outpatient by Dr. Gómez for sinus pauses, tachy-jose syndrome. MCOT shows multiple pauses longest 4.3 sec in sinus on 1/9/2023. Total pauses 17 pauses > 2 sec. AF burden 5%. Patient now presented for elective PPM implant.   On 3/23, patient underwent Dual chamber implant by Dr. Pierce. POD 0, patient was monitored on cardiology telemetry overnight.   POD1, Patient seen and examined at the bedside. No complaints at this time. HD stable, AM labs WNL. No overnight events on tele. AM interrogation WNL, CXR stable. Patient is stable for discharge home on Xarelto 20mg QD ( resume on sunday , 3/26th), HCTZ 12.5mg QD, Amlodipine 10mg QD, Atorvastatin 10mg QD with instruction to follow up with Dr. Pierce in 2-3 weeks. As per Dr. Pierce okay to resume home dose of metoprolol succinate 50mg for AFib rate control.

## 2023-03-23 NOTE — DISCHARGE NOTE PROVIDER - NSDCMRMEDTOKEN_GEN_ALL_CORE_FT
amLODIPine 10 mg oral tablet: 1 tab(s) orally once a day  atorvastatin 10 mg oral tablet: orally once a day (at bedtime)  hydroCHLOROthiazide 12.5 mg oral capsule: 1 cap(s) orally once a day  Lumigan 0.01% ophthalmic solution: 1 drop(s) to each affected eye once a day (in the evening)  Timolol Maleate, Ophthalmic 0.5% preservative-free ophthalmic solution: 1 drop(s) to each affected eye 2 times a day  Xarelto 20 mg oral tablet: 1 tab(s) orally once a day (in the evening)   amLODIPine 10 mg oral tablet: 1 tab(s) orally once a day  atorvastatin 10 mg oral tablet: orally once a day (at bedtime)  hydroCHLOROthiazide 12.5 mg oral capsule: 1 cap(s) orally once a day  Lumigan 0.01% ophthalmic solution: 1 drop(s) to each affected eye once a day (in the evening)  Timolol Maleate, Ophthalmic 0.5% preservative-free ophthalmic solution: 1 drop(s) to each affected eye 2 times a day  Xarelto 20 mg oral tablet: 1 tab(s) orally once a day (in the evening)  (Need to HOLD X48HRS post procedure  can resume on kerri 3/26th)    amLODIPine 10 mg oral tablet: 1 tab(s) orally once a day  atorvastatin 10 mg oral tablet: orally once a day (at bedtime)  hydroCHLOROthiazide 12.5 mg oral capsule: 1 cap(s) orally once a day  Lumigan 0.01% ophthalmic solution: 1 drop(s) to each affected eye once a day (in the evening)  Timolol Maleate, Ophthalmic 0.5% preservative-free ophthalmic solution: 1 drop(s) to each affected eye 2 times a day  Toprol-XL 50 mg oral tablet, extended release: 1 tab(s) orally once a day  Xarelto 20 mg oral tablet: 1 tab(s) orally once a day (in the evening)  (Need to HOLD X48HRS post procedure  can resume on kerri 3/26th)

## 2023-03-23 NOTE — DISCHARGE NOTE PROVIDER - CARE PROVIDER_API CALL
Zeyad Pierce)  Cardiac Electrophysiology; Cardiovascular Disease; [facility]  [address] [place] [address]  Phone: [phone]  Fax: [phone]  Established Patient  Follow Up Time: 1 month   Zeyad Pierce)  Cardiac Electrophysiology; Cardiovascular Disease; [facility]  [address] [place] [address]  Phone: [phone]  Fax: [phone]  Established Patient  Scheduled Appointment: 04/18/2023 11:00 AM   Zeyad Pierce)  Cardiac Electrophysiology; Cardiovascular Disease; [facility]  [address] [place] [address]  Phone: [phone]  Fax: [phone]  Established Patient  Scheduled Appointment: 04/18/2023 11:00 AM    Beena Cano)  Cardiology; Interventional Cardiology  [address] [place] [address]  Phone: [phone]  Fax: [phone]  Follow Up Time: Routine

## 2023-03-24 ENCOUNTER — TRANSCRIPTION ENCOUNTER (OUTPATIENT)
Age: 68
End: 2023-03-24

## 2023-03-24 VITALS
DIASTOLIC BLOOD PRESSURE: 89 MMHG | RESPIRATION RATE: 24 BRPM | TEMPERATURE: 99 F | SYSTOLIC BLOOD PRESSURE: 149 MMHG | HEART RATE: 83 BPM | OXYGEN SATURATION: 95 %

## 2023-03-24 PROCEDURE — 99239 HOSP IP/OBS DSCHRG MGMT >30: CPT

## 2023-03-24 PROCEDURE — 93010 ELECTROCARDIOGRAM REPORT: CPT

## 2023-03-24 PROCEDURE — 71046 X-RAY EXAM CHEST 2 VIEWS: CPT | Mod: 26

## 2023-03-24 RX ORDER — LANOLIN ALCOHOL/MO/W.PET/CERES
3 CREAM (GRAM) TOPICAL AT BEDTIME
Refills: 0 | Status: DISCONTINUED | OUTPATIENT
Start: 2023-03-24 | End: 2023-03-24

## 2023-03-24 RX ORDER — RIVAROXABAN 15 MG-20MG
1 KIT ORAL
Qty: 0 | Refills: 0 | DISCHARGE

## 2023-03-24 RX ORDER — ACETAMINOPHEN 500 MG
650 TABLET ORAL EVERY 6 HOURS
Refills: 0 | Status: DISCONTINUED | OUTPATIENT
Start: 2023-03-24 | End: 2023-03-24

## 2023-03-24 RX ADMIN — Medication 650 MILLIGRAM(S): at 02:00

## 2023-03-24 RX ADMIN — Medication 100 MILLIGRAM(S): at 05:38

## 2023-03-24 RX ADMIN — Medication 650 MILLIGRAM(S): at 00:51

## 2023-03-24 RX ADMIN — Medication 3 MILLIGRAM(S): at 02:53

## 2023-03-24 NOTE — PROGRESS NOTE ADULT - SUBJECTIVE AND OBJECTIVE BOX
INTERVAL HPI/OVERNIGHT EVENTS: No acute events overnight    MEDICATIONS  (STANDING):  amLODIPine   Tablet 10 milliGRAM(s) Oral Once  atorvastatin 10 milliGRAM(s) Oral Once  ceFAZolin   IVPB      ceFAZolin   IVPB 1000 milliGRAM(s) IV Intermittent every 8 hours  hydrochlorothiazide 12.5 milliGRAM(s) Oral Once  latanoprost 0.005% Ophthalmic Solution 1 Drop(s) Both EYES at bedtime    MEDICATIONS  (PRN):  acetaminophen     Tablet .. 650 milliGRAM(s) Oral every 6 hours PRN Mild Pain (1 - 3), Moderate Pain (4 - 6)  melatonin 3 milliGRAM(s) Oral at bedtime PRN Insomnia      Allergies    No Known Allergies    Intolerances        REVIEW OF SYSTEMS: No CP, palpitations, dizziness or SOB    Vital Signs Last 24 Hrs  T(C): 37.4 (24 Mar 2023 07:18), Max: 38.4 (23 Mar 2023 23:42)  T(F): 99.4 (24 Mar 2023 07:18), Max: 101.1 (23 Mar 2023 23:42)  HR: 83 (24 Mar 2023 07:18) (60 - 84)  BP: 149/89 (24 Mar 2023 07:18) (109/60 - 153/83)  BP(mean): 116 (24 Mar 2023 07:18) (76 - 116)  RR: 24 (24 Mar 2023 07:18) (20 - 24)  SpO2: 95% (24 Mar 2023 07:18) (95% - 98%)    Parameters below as of 23 Mar 2023 12:30  Patient On (Oxygen Delivery Method): nasal cannula  O2 Flow (L/min): 2        Physical Exam  GENERAL: In no apparent distress, well nourished, and hydrated.  EYES: EOMI, PERRLA, conjunctiva and sclera clear  NECK: Supple  HEART: Irregular rate and rhythm; No murmurs, rubs, or gallops.  PULMONARY: Clear to auscultation and perfusion.  No rales, wheezing, or rhonchi bilaterally.  EXTREMITIES:  2+ Peripheral Pulses, No clubbing, cyanosis, or edema  SKIN: Dressing over L chest wall, no hematoma  NEUROLOGICAL: Grossly nonfocal    LABS:                RADIOLOGY & ADDITIONAL TESTS:

## 2023-03-24 NOTE — PROGRESS NOTE ADULT - ASSESSMENT
Assessment: 66 yo Male with a PMH of HTN, HLD, Moderate AS,  anxiety, JAVI (uses CPAP), non-obstructive CAD (mLAD 50%, iFR negative 2/2023), new onset Paroxysmal A/Fibrillation/ AFlutter (on Xarelto). He was evaluated outpatient by Dr. Gómez for sinus pauses, tachy-jose syndrome. MCOT shows multiple pauses longest 4.3 sec in sinus on 1/9/2023. Total pauses 17 pauses > 2 sec. AF burden 5%. Patient now presented for elective PPM implant. Patient POD #1 and feeling well    Impression:  TachyBrady Syndrome sp DC PPM ([employer])  New PAF/AFL  Sinus Pauses  Mod AS  JAVI on CPAP    Plan:  - CXR completed  - Interrogation completed, numbers within appropriate range  - Resume Xarelto Sunday  - Continue all other home medications  - Do not lift L arm above shoulder height or lift > 5 lbs for 6 weeks  - Do not get area wet for 5 days  - Remove dressing in 5 days  - Leave steri strips in place until they fall off on their own  - Follow up with Dr Pierce in one month

## 2023-03-28 DIAGNOSIS — G47.33 OBSTRUCTIVE SLEEP APNEA (ADULT) (PEDIATRIC): ICD-10-CM

## 2023-03-28 DIAGNOSIS — I10 ESSENTIAL (PRIMARY) HYPERTENSION: ICD-10-CM

## 2023-03-28 DIAGNOSIS — E66.9 OBESITY, UNSPECIFIED: ICD-10-CM

## 2023-03-28 DIAGNOSIS — Z79.01 LONG TERM (CURRENT) USE OF ANTICOAGULANTS: ICD-10-CM

## 2023-03-28 DIAGNOSIS — E78.5 HYPERLIPIDEMIA, UNSPECIFIED: ICD-10-CM

## 2023-03-28 DIAGNOSIS — I48.92 UNSPECIFIED ATRIAL FLUTTER: ICD-10-CM

## 2023-03-28 DIAGNOSIS — I35.0 NONRHEUMATIC AORTIC (VALVE) STENOSIS: ICD-10-CM

## 2023-03-28 DIAGNOSIS — I25.10 ATHEROSCLEROTIC HEART DISEASE OF NATIVE CORONARY ARTERY WITHOUT ANGINA PECTORIS: ICD-10-CM

## 2023-03-28 DIAGNOSIS — I48.0 PAROXYSMAL ATRIAL FIBRILLATION: ICD-10-CM

## 2023-03-28 DIAGNOSIS — I49.5 SICK SINUS SYNDROME: ICD-10-CM

## 2023-04-11 NOTE — PATIENT PROFILE ADULT - DOES PATIENT HAVE ADVANCE DIRECTIVE
No Follicular Atypia Histology Text: Follicular atypia was noted and reviewed with patient, patient was advised to monitor and report any skin changes.

## 2023-04-18 ENCOUNTER — TRANSCRIPTION ENCOUNTER (OUTPATIENT)
Age: 68
End: 2023-04-18

## 2023-04-18 ENCOUNTER — APPOINTMENT (OUTPATIENT)
Dept: ELECTROPHYSIOLOGY | Facility: CLINIC | Age: 68
End: 2023-04-18
Payer: MEDICARE

## 2023-04-18 VITALS
SYSTOLIC BLOOD PRESSURE: 160 MMHG | HEART RATE: 60 BPM | WEIGHT: 195 LBS | BODY MASS INDEX: 30.54 KG/M2 | DIASTOLIC BLOOD PRESSURE: 100 MMHG

## 2023-04-18 DIAGNOSIS — Z48.89 ENCOUNTER FOR OTHER SPECIFIED SURGICAL AFTERCARE: ICD-10-CM

## 2023-04-18 PROBLEM — H40.9 UNSPECIFIED GLAUCOMA: Chronic | Status: ACTIVE | Noted: 2023-03-16

## 2023-04-18 PROCEDURE — 93280 PM DEVICE PROGR EVAL DUAL: CPT

## 2023-04-18 PROCEDURE — 93000 ELECTROCARDIOGRAM COMPLETE: CPT | Mod: 59

## 2023-04-18 PROCEDURE — 99024 POSTOP FOLLOW-UP VISIT: CPT

## 2023-04-18 RX ORDER — GLUCOSAMINE/MSM/CHONDROIT SULF 500-166.6
10 TABLET ORAL DAILY
Refills: 0 | Status: DISCONTINUED | COMMUNITY
End: 2023-04-18

## 2023-04-19 PROBLEM — Z48.89 ENCOUNTER FOR POSTOPERATIVE WOUND CHECK: Status: ACTIVE | Noted: 2023-04-19

## 2023-04-19 NOTE — PHYSICAL EXAM
[General Appearance - Well Developed] : well developed [Normal Appearance] : normal appearance [Well Groomed] : well groomed [General Appearance - Well Nourished] : well nourished [No Deformities] : no deformities [General Appearance - In No Acute Distress] : no acute distress [Heart Rate And Rhythm] : heart rate and rhythm were normal [Edema] : no peripheral edema present [] : no respiratory distress [Respiration, Rhythm And Depth] : normal respiratory rhythm and effort [Left Infraclavicular] : left infraclavicular area [Clean] : clean [Dry] : dry [Well-Healed] : well-healed [Abdomen Soft] : soft [Erythema] : not erythematous

## 2023-04-19 NOTE — ASSESSMENT
[FreeTextEntry1] : s/p PPM for sick sinus syndrome, AFIB with multiple pauses up to 4.3 secs on MCOT.\par -His incision has healed well\par -Device interrogation showed stable parameter. Not dependent.\par \par -He is enrolled on remote.\par Remote monitoring was discussed with patient, schedule, process,  as well as associated co-pay that may not covered by their insurance.\par \par -Tachy jose syndrome\par started with metoprolol succ 25mg daily \par HR today on the 60 bpm\par \par - HTN - uncontrolled today - he said usually its better controlled at home. He will keep a record.\par   On amlodipine, HCTZ\par   Advise to avoid food with high sodium.\par \par -PAF -  26 % burden - On xarelto 20mg daily - he denies any issue of bleeding\par

## 2023-04-19 NOTE — DISCUSSION/SUMMARY
[FreeTextEntry1] : Mr. Edvin Ribeiro is a pleasant 67-year-old man with hypertension, hyperlipidemia, obstructive sleep apnea on CPAP, non-obstructive CAD, Obesity with a BMI of 30.5, moderate aortic stenosis, paroxysmal atrial fibrillation, atrial flutter, sinus pauses, and tachy-jose syndrome. Patient was diagnosed with atrial flutter in October 2022 and atrial fibrillation in November 2022. He was on Metoprolol for rate control, and he is on Xarelto. Metoprolol was stopped due to pauses.\par \par I discussed with patient the management strategy for atrial fibrillation at length, including medical therapy with calcium channel blocker and beta blocker, antiarrhythmics therapy, and catheter ablation. I also discussed stroke prevention at length. I recommend continuing the same medication as patient is rate controlled at metoprolol 50. I also recommend continuing Xarelto for stroke prevention.\par \par In view of his dizziness, patient might be having a period of bradycardia. I recommend a four-week MCOT to correlate between his dizziness, lightheadedness, and feeling fainty and any Bradyarrhythmias secondary to medications. Other possibilities for his dizziness are pauses. \par \par I reviewed result of MCOT with patient and family. \par I recommend dual PPM for the treatment of Tachy-jose syndrome with pauses limiting use of Metoprolol and Antiarrhythmics needed for AF.\par  a dual chamber pacemaker was  implanted on 3/23/2023\par \par .\par \par

## 2023-04-19 NOTE — CARDIOLOGY SUMMARY
[de-identified] : 4/18/2023 - 60bpm sinus rhythm with 1st degree AVB [place] 248ms [de-identified] : Holter 12/12 to 01/10/2023 - atrial fib 5% burden with multiple pauses up to 4.3 secs Brief nsvt for 14 beats [de-identified] : 6/6/2022  TTE -LVEF normal, mild MR [de-identified] : 3/23/2023- [employer] dual chamber PPM

## 2023-04-19 NOTE — PROCEDURE
[No] : not [NSR] : normal sinus rhythm [Pacemaker] : pacemaker [Voltage: ___ volts] : Voltage was [unfilled] volts [Longevity: ___ months] : The estimated remaining battery life is [unfilled] months [Threshold Testing Performed] : Threshold testing was performed [Atrial] : Atrial [Ventricular] : Ventricular [Lead Imp:  ___ohms] : lead impedance was [unfilled] ohms [Sensing Amplitude ___mv] : sensing amplitude was [unfilled] mv [___V @] : [unfilled] V [___ ms] : [unfilled] ms [de-identified] : Medtronic Rosario XT MRI [de-identified] : [id number] [de-identified] : [id number] [de-identified] : WANDY [de-identified] : 3/23/2023 [de-identified] :  27.7%\par AP 36.2%

## 2023-07-13 NOTE — ED ADULT TRIAGE NOTE - AS TEMP SITE
2023       Sam Madrid MD  [address] [place] [id number]  Via In Basket      Patient: Violetta Cazares   YOB: 1953   Date of Visit: 2023       Dear Dr. Madrid:    I saw your patient, Violetta Cazares, for an evaluation. Below are my notes for this visit with her.    If you have questions, please do not hesitate to call me.      Sincerely,        Tabby Marin CNP        CC: No Recipients  Tabby Marin CNP  2023 10:51 AM  Signed       HPI     Violetta Cazares is a 70 year old female who presents to the clinic for follow up of the management of sleep apnea.     Since using the machine/Last Compliance visit  Without machine  Patient has noted, they are able to sleep 3-4 hours consecutively a day.  Bathroom breaks are happening on average are 5x  during sleeping time. Per patient she is on water pills.  Day time nap desire: []Improved [x]Not Improve []Same Or  []N/A  Morning Headaches: [x]Improved []Not Improve []Same Or  []N/A no morning headaches in 2 months.  Energy levels: [x]Improved []Not Improve []Same Or  []N/A   Any issues with Mask: [x]NO     []Yes      Detail if indicated:   Any issues with Machine: [x]NO    []Yes  Detail if indicated:   Any issues with Pressure: []NO   [x]Yes  Detail if indicated:  Cannot tolerate higher pressures.  Any factors that contribute to compliance issues? []NO  [x]YES, if so why?  Patient states she cannot  Tolerate the pressures and it gives her a headache. Patient explained options of dental device or changing pressures to 6 cwp, base on titration sleep study. Patient would like to try out 6 cwp and follow up with MAD if she still cannot tolerate       PRIOR SLEEP STUDY-(Study Type and Date): PSG 22  RESULTS: 1. AHI: 9.5       REM AHI  35                   2. DESATURATION CAROLANN: 88%    3. Central apnea index: 0.7      Type of Therapy: AUTOPAP  Pressure Settin-11  cmH20  Mask Type: ESON NASAL MASK- MEDIUM  DME: HME  DME  Phone: [phone]  Setup Date:5/4/23       LAST SEEN BY SLEEP CENTER:6/20/23- COMPLIANCE 2%    LAST SEEN BY SLEEP CENTER: 4/25/23 DME ORDERED     Compliance Download:       DATES: 6/20/23-7/11/23 (22 DAYS)   % DAYS > 4 HOURS 0%    Ave Usage (days used) 55 MINS   95th% PRESSURE 10.8 CWP   95th% MASK LEAK 10.8 L/MIN   AHI 0 /HR        [place] Sleepiness Scale:     [place] Score     [place] total score    18            Pertinent Labs:  TSH (mcunit/mL)   Date Value   05/17/2018 1.073     Lab Results   Component Value Date    VB12 730 02/06/2019     Vitamin D, 25-Hydroxy (ng/mL)   Date Value   09/23/2022 51.4     HGB (g/dL)   Date Value   03/23/2023 10.2 (L)     HCT (%)   Date Value   03/23/2023 32.0 (L)     Ferritin (ng/mL)   Date Value   03/23/2023 266 (H)   09/23/2022 317 (H)   04/01/2022 254 (H)     Iron (mcg/dL)   Date Value   03/23/2023 56   09/23/2022 48 (L)   04/01/2022 63     Hemoglobin A1C (%)   Date Value   05/03/2023 7.3 (H)     No results found for: \"EF\"    Past Medical History:  Past Medical History:   Diagnosis Date   • Chronic kidney disease     stage 4   • Congestive cardiac failure (CMD)    • Diabetes mellitus (CMD)    • Essential (primary) hypertension    • Hepatitis B    • Herpes    • No known problems    • Sleep apnea        ALLERGIES:   Allergen Reactions   • Sulfa Antibiotics Other (See Comments)     Unknown         Current Outpatient Medications   Medication Sig   • furosemide (LASIX) 20 MG tablet TAKE 1 TABLET BY MOUTH TWICE A DAY   • amLODIPine (NORVASC) 5 MG tablet TAKE 1 TABLET BY MOUTH EVERY DAY   • labetalol (NORMODYNE) 200 MG tablet TAKE 1 TABLET BY MOUTH IN THE MORNING AND IN THE EVENING   • MAGIC MOUTHWASH (antacid-diphenhydramine-lidocaine) 1:1:1 oral susp Swish and spit 5 mLs 4 times daily (before meals and nightly).   • Insulin Degludec-Liraglutide (Xultophy) 100-3.6 UNIT-MG/ML Solution Pen-injector Inject 14 Units into the skin daily. Replaces soliqua.   • hydrALAZINE (APRESOLINE)  50 MG tablet TAKE 1 TABLET BY MOUTH TWICE A DAY   • losartan (COZAAR) 50 MG tablet TAKE 1 TABLET BY MOUTH EVERY DAY   • ferrous sulfate 325 (65 FE) MG tablet Take 1 tablet by mouth in the morning and 1 tablet in the evening. Take with meals.   • estradiol (ESTRACE) 0.1 MG/GM vaginal cream Place 1 g vaginally 3 days a week.   • rosuvastatin (CRESTOR) 20 MG tablet Take 1 tablet by mouth daily.   • Blood Glucose Monitoring Suppl (ONE TOUCH ULTRA 2) w/Device Kit Use as directed   • OneTouch Delica Lancets 30G Misc 1 each 2 times daily. Use as directed. E11.65   • blood glucose (OneTouch Ultra) test strip Test blood sugar 2 times daily. Diagnosis: E11.65. Meter: OneTouch Ultra   • Insulin Pen Needle (Droplet Pen Needles) 31G X 5 MM Misc Use to inject insulin 1 times daily. Remove needle cover(s) to expose needle before injecting.   • Alcohol Swabs (B-D SINGLE USE SWABS REGULAR) Pads Use as directed   • Cholecalciferol (vitamin D) 50 mcg (2,000 units) capsule Take 1 capsule by mouth daily.   • aspirin (ECOTRIN) 81 MG EC tablet Take 81 mg by mouth daily.     No current facility-administered medications for this visit.       Review of Systems:     Review of Systems   Constitutional: Negative for chills, diaphoresis, fever and weight loss.   HENT: Negative.    Eyes: Negative for pain, photophobia and redness.   Cardiovascular: Negative for chest pain and cyanosis.   Respiratory: Negative for hemoptysis.    Hematologic/Lymphatic: Negative.    Skin: Negative for color change and unusual hair distribution.   Musculoskeletal: Negative for neck pain.   Neurological: Negative for excessive daytime sleepiness.   Psychiatric/Behavioral: Negative for altered mental status and hallucinations.   Allergic/Immunologic: Negative.            Physical Examination:  Visit Vitals  /70 (BP Location: LUE - Left upper extremity, Patient Position: Sitting)   Pulse 75   Temp 97.5 °F (36.4 °C) (Temporal)   Resp 16   Ht 5' 6\" (1.676 m)   Wt 87  kg (191 lb 12.8 oz)   SpO2 97%   BMI 30.96 kg/m²     Body mass index is 30.96 kg/m².  Neck Circumference: 14.5 in    Nasal Passages: [x]Clear   [] Congested   Palate: Evans [] I  [] II   [x] III   [] IV              Eyrtherna/edema [x]none []+1  []+2  []+3  []+4  Neck:  No enlarged lymph nodes  Gen: No acute distress. Pleasant and interactive.  Head:  Normocephalic and atraumatic.  Eyes: Conjunctiva and sclera normal.   Heart:  Normal rate and regular rhythm, no murmur.  Lungs:  Normal respiratory rate and effort, breath sounds equal.  Extremities:  No edema in lower extremities.   Skin: Warm and dry, no rash.  Musculoskeletal:  No joint swelling or tenderness  Psych:  Affect was appropriate to situation and mood was normal.  Neuro:  Alert and oriented, attention and recall preserved      Assessment and PLAN:    Problem List Items Addressed This Visit        Sleep    SAWYER (obstructive sleep apnea) - Primary   Other Visit Diagnoses     Nocturia                Patient is benefiting from CPAP/BiPAP therapy: No ,0 % COMPLIANT  Continue current CPAP/BiPAP: Yes   Daytime sleepiness compensated: ess 18, PT NOT USING MACHINE  Blood pressure controlled: Yes   Weight management progressing or optimal: Yes     RECOMMENDATIONS/COMMENTS:  Replace current mask and supplies as warranted.  Mask change- Yes [] No[x]  New Rx: Yes [x] No[]  Changed via airview - 6 cwp.  If not able to tolerate, switch to MAD device and discontinue machine    A Certified Clinical Sleep Educator / Respiratory Therapist/Provider met with the patient at this visit for:  [x] review of daytime sleepiness questionnaire   [x] review of current PAP therapy efficacy and compliance data  [x]education regarding the patient's current sleep condition(s)  [x]adjustment of PAP therapy settings  [x]coordination of future care for patient.   [x]Healthy Living Plan: reviewed with patient-Diet and Exercise    COMPLETE ONLY IF NOT ACCLIMATED TO MASK: acclimated to the  mask, pt is instructed to the following:  • Goal is to sit up with the machine nightly for 10-15 min prior to laying down. This process will help support the patient to acclimate to the mask.   • Patients goal is to lay with the machine nightly for 10-15 min with the focus on deep  breathing to encourage pap acclimation  • Reviewed with the patient the benefits of a room humidifier to help hydrate the room air.     COMPLETE ONLY IF BEDTIME ROUTINE ISSUES: Pt to follow a bed time routine prescribed to ensure compliance.  Routine starts at   Bed time:   Wake time:   A total hours of sleep recording nightly:   For a goal of 6.0 hours minimum of usage daily and to sleep 7-8 hrs.      We reviewed the implications of untreated sleep apnea, including cardiovascular, cerebral, and mortality risk.  We discussed in detail treatments available to the patient.  Patient/ guardian has verbalized understanding and commitment to ensure a nightly usage of 5 or more hours to increase overall compliance to a minimum requirement of 70% or better. PT to use at all sleep opportunities, including naps.  The patient was cautioned not to drive while sleepy.  The patient was advised to bring CPAP/BIPAP machine to any procedure that requires sedation.  Reviewed in detail when to replace supplies along with cleaning techniques. Tips provide on traveling, air leaks, nasal congestion, dry mouth, skin irritation, and gas/bloating.   Correct number given for DME.  Consider over the counter sprays (ex. biotene) to moisturize dry mouth, adjust humidifier settings, and consider insulating tubing.       PT is not meeting compliance and has an anticipated follow up of  Return in about 6 weeks (around 8/24/2023).      Attestation Statement - Patient was seen by APN and/or [facility] (who helped scribe note) and I agree with the scribes documentation.    Tabby Marin,MSN, APRN, NP-C       oral

## 2023-07-18 ENCOUNTER — NON-APPOINTMENT (OUTPATIENT)
Age: 68
End: 2023-07-18

## 2023-07-18 ENCOUNTER — APPOINTMENT (OUTPATIENT)
Dept: CARDIOLOGY | Facility: CLINIC | Age: 68
End: 2023-07-18
Payer: MEDICARE

## 2023-07-18 PROCEDURE — 93294 REM INTERROG EVL PM/LDLS PM: CPT

## 2023-07-18 PROCEDURE — 93296 REM INTERROG EVL PM/IDS: CPT

## 2023-10-17 ENCOUNTER — APPOINTMENT (OUTPATIENT)
Dept: CARDIOLOGY | Facility: CLINIC | Age: 68
End: 2023-10-17

## 2023-11-08 ENCOUNTER — APPOINTMENT (OUTPATIENT)
Dept: ELECTROPHYSIOLOGY | Facility: CLINIC | Age: 68
End: 2023-11-08
Payer: MEDICARE

## 2023-11-08 VITALS
BODY MASS INDEX: 30.13 KG/M2 | DIASTOLIC BLOOD PRESSURE: 101 MMHG | HEART RATE: 64 BPM | HEIGHT: 67 IN | SYSTOLIC BLOOD PRESSURE: 173 MMHG | TEMPERATURE: 98 F | WEIGHT: 192 LBS

## 2023-11-08 VITALS — SYSTOLIC BLOOD PRESSURE: 146 MMHG | DIASTOLIC BLOOD PRESSURE: 84 MMHG

## 2023-11-08 DIAGNOSIS — I45.5 OTHER SPECIFIED HEART BLOCK: ICD-10-CM

## 2023-11-08 PROCEDURE — 93280 PM DEVICE PROGR EVAL DUAL: CPT

## 2024-01-01 NOTE — ED PROVIDER NOTE - NS ED MD DISPO DISCHARGE CCDA
(0) lying quietly, normal position, moves easily/(0) content, relaxed/(0) no cry (awake or asleep)/(0) no particular expression or smile/(0) normal position or relaxed
Patient/Caregiver provided printed discharge information.

## 2024-02-07 ENCOUNTER — NON-APPOINTMENT (OUTPATIENT)
Age: 69
End: 2024-02-07

## 2024-02-07 ENCOUNTER — APPOINTMENT (OUTPATIENT)
Dept: CARDIOLOGY | Facility: CLINIC | Age: 69
End: 2024-02-07
Payer: MEDICARE

## 2024-02-08 PROCEDURE — 93296 REM INTERROG EVL PM/IDS: CPT

## 2024-02-08 PROCEDURE — 93294 REM INTERROG EVL PM/LDLS PM: CPT

## 2024-03-08 ENCOUNTER — OUTPATIENT (OUTPATIENT)
Dept: OUTPATIENT SERVICES | Facility: HOSPITAL | Age: 69
LOS: 1 days | End: 2024-03-08
Payer: MEDICARE

## 2024-03-08 DIAGNOSIS — Z00.8 ENCOUNTER FOR OTHER GENERAL EXAMINATION: ICD-10-CM

## 2024-03-08 DIAGNOSIS — M54.16 RADICULOPATHY, LUMBAR REGION: ICD-10-CM

## 2024-03-08 DIAGNOSIS — N28.1 CYST OF KIDNEY, ACQUIRED: ICD-10-CM

## 2024-03-08 DIAGNOSIS — Z98.890 OTHER SPECIFIED POSTPROCEDURAL STATES: Chronic | ICD-10-CM

## 2024-03-08 PROCEDURE — 72110 X-RAY EXAM L-2 SPINE 4/>VWS: CPT | Mod: 26

## 2024-03-08 PROCEDURE — 73522 X-RAY EXAM HIPS BI 3-4 VIEWS: CPT | Mod: 26

## 2024-03-08 PROCEDURE — 76775 US EXAM ABDO BACK WALL LIM: CPT | Mod: 26

## 2024-03-08 PROCEDURE — 73522 X-RAY EXAM HIPS BI 3-4 VIEWS: CPT

## 2024-03-08 PROCEDURE — 76775 US EXAM ABDO BACK WALL LIM: CPT

## 2024-03-08 PROCEDURE — 72110 X-RAY EXAM L-2 SPINE 4/>VWS: CPT

## 2024-03-09 DIAGNOSIS — M54.16 RADICULOPATHY, LUMBAR REGION: ICD-10-CM

## 2024-03-09 DIAGNOSIS — N28.1 CYST OF KIDNEY, ACQUIRED: ICD-10-CM

## 2024-03-26 ENCOUNTER — OUTPATIENT (OUTPATIENT)
Dept: OUTPATIENT SERVICES | Facility: HOSPITAL | Age: 69
LOS: 1 days | End: 2024-03-26
Payer: MEDICARE

## 2024-03-26 DIAGNOSIS — M47.022: ICD-10-CM

## 2024-03-26 DIAGNOSIS — Z00.8 ENCOUNTER FOR OTHER GENERAL EXAMINATION: ICD-10-CM

## 2024-03-26 DIAGNOSIS — Z98.890 OTHER SPECIFIED POSTPROCEDURAL STATES: Chronic | ICD-10-CM

## 2024-03-26 PROCEDURE — 93886 INTRACRANIAL COMPLETE STUDY: CPT | Mod: 26

## 2024-03-26 PROCEDURE — 93886 INTRACRANIAL COMPLETE STUDY: CPT

## 2024-03-27 DIAGNOSIS — M47.022: ICD-10-CM

## 2024-04-07 ENCOUNTER — EMERGENCY (EMERGENCY)
Facility: HOSPITAL | Age: 69
LOS: 0 days | Discharge: HOME | End: 2024-04-07
Attending: EMERGENCY MEDICINE
Payer: MEDICARE

## 2024-04-07 VITALS
HEART RATE: 65 BPM | DIASTOLIC BLOOD PRESSURE: 79 MMHG | RESPIRATION RATE: 18 BRPM | OXYGEN SATURATION: 98 % | TEMPERATURE: 98 F | SYSTOLIC BLOOD PRESSURE: 145 MMHG

## 2024-04-07 VITALS
SYSTOLIC BLOOD PRESSURE: 160 MMHG | OXYGEN SATURATION: 99 % | HEART RATE: 68 BPM | WEIGHT: 199.96 LBS | DIASTOLIC BLOOD PRESSURE: 98 MMHG | TEMPERATURE: 98 F | RESPIRATION RATE: 18 BRPM

## 2024-04-07 DIAGNOSIS — N28.1 CYST OF KIDNEY, ACQUIRED: ICD-10-CM

## 2024-04-07 DIAGNOSIS — K83.8 OTHER SPECIFIED DISEASES OF BILIARY TRACT: ICD-10-CM

## 2024-04-07 DIAGNOSIS — G89.29 OTHER CHRONIC PAIN: ICD-10-CM

## 2024-04-07 DIAGNOSIS — Z98.890 OTHER SPECIFIED POSTPROCEDURAL STATES: Chronic | ICD-10-CM

## 2024-04-07 DIAGNOSIS — Z95.0 PRESENCE OF CARDIAC PACEMAKER: ICD-10-CM

## 2024-04-07 DIAGNOSIS — Z79.01 LONG TERM (CURRENT) USE OF ANTICOAGULANTS: ICD-10-CM

## 2024-04-07 DIAGNOSIS — I10 ESSENTIAL (PRIMARY) HYPERTENSION: ICD-10-CM

## 2024-04-07 DIAGNOSIS — M54.50 LOW BACK PAIN, UNSPECIFIED: ICD-10-CM

## 2024-04-07 DIAGNOSIS — I48.91 UNSPECIFIED ATRIAL FIBRILLATION: ICD-10-CM

## 2024-04-07 DIAGNOSIS — E78.5 HYPERLIPIDEMIA, UNSPECIFIED: ICD-10-CM

## 2024-04-07 DIAGNOSIS — R10.9 UNSPECIFIED ABDOMINAL PAIN: ICD-10-CM

## 2024-04-07 LAB
ALBUMIN SERPL ELPH-MCNC: 4.2 G/DL — SIGNIFICANT CHANGE UP (ref 3.5–5.2)
ALP SERPL-CCNC: 89 U/L — SIGNIFICANT CHANGE UP (ref 30–115)
ALT FLD-CCNC: 13 U/L — SIGNIFICANT CHANGE UP (ref 0–41)
ANION GAP SERPL CALC-SCNC: 11 MMOL/L — SIGNIFICANT CHANGE UP (ref 7–14)
APPEARANCE UR: CLEAR — SIGNIFICANT CHANGE UP
AST SERPL-CCNC: 15 U/L — SIGNIFICANT CHANGE UP (ref 0–41)
BASOPHILS # BLD AUTO: 0.04 K/UL — SIGNIFICANT CHANGE UP (ref 0–0.2)
BASOPHILS NFR BLD AUTO: 0.4 % — SIGNIFICANT CHANGE UP (ref 0–1)
BILIRUB SERPL-MCNC: 0.4 MG/DL — SIGNIFICANT CHANGE UP (ref 0.2–1.2)
BILIRUB UR-MCNC: NEGATIVE — SIGNIFICANT CHANGE UP
BUN SERPL-MCNC: 22 MG/DL — HIGH (ref 10–20)
CALCIUM SERPL-MCNC: 9.9 MG/DL — SIGNIFICANT CHANGE UP (ref 8.4–10.4)
CHLORIDE SERPL-SCNC: 101 MMOL/L — SIGNIFICANT CHANGE UP (ref 98–110)
CO2 SERPL-SCNC: 26 MMOL/L — SIGNIFICANT CHANGE UP (ref 17–32)
COLOR SPEC: YELLOW — SIGNIFICANT CHANGE UP
CREAT SERPL-MCNC: 1 MG/DL — SIGNIFICANT CHANGE UP (ref 0.7–1.5)
DIFF PNL FLD: ABNORMAL
EGFR: 81 ML/MIN/1.73M2 — SIGNIFICANT CHANGE UP
EOSINOPHIL # BLD AUTO: 0.3 K/UL — SIGNIFICANT CHANGE UP (ref 0–0.7)
EOSINOPHIL NFR BLD AUTO: 3.3 % — SIGNIFICANT CHANGE UP (ref 0–8)
GLUCOSE SERPL-MCNC: 133 MG/DL — HIGH (ref 70–99)
GLUCOSE UR QL: NEGATIVE MG/DL — SIGNIFICANT CHANGE UP
HCT VFR BLD CALC: 43.5 % — SIGNIFICANT CHANGE UP (ref 42–52)
HGB BLD-MCNC: 14.6 G/DL — SIGNIFICANT CHANGE UP (ref 14–18)
IMM GRANULOCYTES NFR BLD AUTO: 0.2 % — SIGNIFICANT CHANGE UP (ref 0.1–0.3)
KETONES UR-MCNC: NEGATIVE MG/DL — SIGNIFICANT CHANGE UP
LEUKOCYTE ESTERASE UR-ACNC: NEGATIVE — SIGNIFICANT CHANGE UP
LIDOCAIN IGE QN: 51 U/L — SIGNIFICANT CHANGE UP (ref 7–60)
LYMPHOCYTES # BLD AUTO: 1.86 K/UL — SIGNIFICANT CHANGE UP (ref 1.2–3.4)
LYMPHOCYTES # BLD AUTO: 20.7 % — SIGNIFICANT CHANGE UP (ref 20.5–51.1)
MCHC RBC-ENTMCNC: 30.7 PG — SIGNIFICANT CHANGE UP (ref 27–31)
MCHC RBC-ENTMCNC: 33.6 G/DL — SIGNIFICANT CHANGE UP (ref 32–37)
MCV RBC AUTO: 91.4 FL — SIGNIFICANT CHANGE UP (ref 80–94)
MONOCYTES # BLD AUTO: 0.88 K/UL — HIGH (ref 0.1–0.6)
MONOCYTES NFR BLD AUTO: 9.8 % — HIGH (ref 1.7–9.3)
NEUTROPHILS # BLD AUTO: 5.89 K/UL — SIGNIFICANT CHANGE UP (ref 1.4–6.5)
NEUTROPHILS NFR BLD AUTO: 65.6 % — SIGNIFICANT CHANGE UP (ref 42.2–75.2)
NITRITE UR-MCNC: NEGATIVE — SIGNIFICANT CHANGE UP
NRBC # BLD: 0 /100 WBCS — SIGNIFICANT CHANGE UP (ref 0–0)
PH UR: 5.5 — SIGNIFICANT CHANGE UP (ref 5–8)
PLATELET # BLD AUTO: 175 K/UL — SIGNIFICANT CHANGE UP (ref 130–400)
PMV BLD: 11.7 FL — HIGH (ref 7.4–10.4)
POTASSIUM SERPL-MCNC: 4.4 MMOL/L — SIGNIFICANT CHANGE UP (ref 3.5–5)
POTASSIUM SERPL-SCNC: 4.4 MMOL/L — SIGNIFICANT CHANGE UP (ref 3.5–5)
PROT SERPL-MCNC: 7.1 G/DL — SIGNIFICANT CHANGE UP (ref 6–8)
PROT UR-MCNC: NEGATIVE MG/DL — SIGNIFICANT CHANGE UP
RBC # BLD: 4.76 M/UL — SIGNIFICANT CHANGE UP (ref 4.7–6.1)
RBC # FLD: 13.4 % — SIGNIFICANT CHANGE UP (ref 11.5–14.5)
SODIUM SERPL-SCNC: 138 MMOL/L — SIGNIFICANT CHANGE UP (ref 135–146)
SP GR SPEC: 1.02 — SIGNIFICANT CHANGE UP (ref 1–1.03)
UROBILINOGEN FLD QL: 0.2 MG/DL — SIGNIFICANT CHANGE UP (ref 0.2–1)
WBC # BLD: 8.99 K/UL — SIGNIFICANT CHANGE UP (ref 4.8–10.8)
WBC # FLD AUTO: 8.99 K/UL — SIGNIFICANT CHANGE UP (ref 4.8–10.8)

## 2024-04-07 PROCEDURE — 85025 COMPLETE CBC W/AUTO DIFF WBC: CPT

## 2024-04-07 PROCEDURE — 36415 COLL VENOUS BLD VENIPUNCTURE: CPT

## 2024-04-07 PROCEDURE — 99284 EMERGENCY DEPT VISIT MOD MDM: CPT | Mod: 25

## 2024-04-07 PROCEDURE — 80053 COMPREHEN METABOLIC PANEL: CPT

## 2024-04-07 PROCEDURE — 96374 THER/PROPH/DIAG INJ IV PUSH: CPT | Mod: XU

## 2024-04-07 PROCEDURE — 81001 URINALYSIS AUTO W/SCOPE: CPT

## 2024-04-07 PROCEDURE — 83690 ASSAY OF LIPASE: CPT

## 2024-04-07 PROCEDURE — 99285 EMERGENCY DEPT VISIT HI MDM: CPT

## 2024-04-07 PROCEDURE — 74177 CT ABD & PELVIS W/CONTRAST: CPT | Mod: 26,MC

## 2024-04-07 PROCEDURE — 74177 CT ABD & PELVIS W/CONTRAST: CPT | Mod: MC

## 2024-04-07 PROCEDURE — 87086 URINE CULTURE/COLONY COUNT: CPT

## 2024-04-07 RX ORDER — MORPHINE SULFATE 50 MG/1
4 CAPSULE, EXTENDED RELEASE ORAL ONCE
Refills: 0 | Status: DISCONTINUED | OUTPATIENT
Start: 2024-04-07 | End: 2024-04-07

## 2024-04-07 RX ORDER — ACETAMINOPHEN 500 MG
975 TABLET ORAL ONCE
Refills: 0 | Status: COMPLETED | OUTPATIENT
Start: 2024-04-07 | End: 2024-04-07

## 2024-04-07 RX ADMIN — Medication 975 MILLIGRAM(S): at 05:34

## 2024-04-07 RX ADMIN — MORPHINE SULFATE 4 MILLIGRAM(S): 50 CAPSULE, EXTENDED RELEASE ORAL at 02:21

## 2024-04-07 NOTE — ED PROVIDER NOTE - PROGRESS NOTE DETAILS
Labs unremarkable.  CT shows dilated CBD duct with no other acute intra-abdominal pathology.  Patient has been made aware of all the incidental findings.  Patient is stable for discharge.  Will have patient follow-up with GI, urology, and neurosurgery.

## 2024-04-07 NOTE — ED PROVIDER NOTE - CARE PROVIDER_API CALL
Ofe Salazar  Gastroenterology  [address] [place] [address]  Phone: [phone]  Fax: [phone]  Follow Up Time: 1-3 Days    Ajith Munoz  Urology  [address] [place] [address]  Phone: [phone]  Fax: [phone]  Follow Up Time: 1-3 Days    Bruna Rodgers  Neurosurgery  [address] [place] [address], [facility] [address]  [place] [address]  Phone: [phone]  Fax: [phone]  Follow Up Time: 1-3 Days

## 2024-04-07 NOTE — ED PROVIDER NOTE - CLINICAL SUMMARY MEDICAL DECISION MAKING FREE TEXT BOX
Discussed with patient in detail about the  findings of the labs/CT and including incidental findings, patient verbalized understanding the information discussed. Also discussed with patient in detail about the need for close outpatient follow up for further evaluation of her CT findings. Patient verbalized understanding and agreed.  patient remained stable in ED, improved well, results of the diagnostic studies reviewed and discussed with patient, Patient remained awake, alert, ambulatory and comfortable, tolerated PO. Appropriate medications given and effects reassessed.  Discussed with patient in detail about the need for close outpatient follow up and the need to return to ED for any persistent, or worsening symptoms, for any new symptoms/concerns. patient verbalized understanding and agreed. patient is given detail aftercare instructions and is instructed well to f/u as outpatient for further care. Discussed with patient in detail about the  findings of the labs/CT and including incidental findings, patient verbalized understanding the information discussed. Also discussed with patient in detail about the need for close outpatient follow up for further evaluation of her CT findings. Patient verbalized understanding and agreed. Copies of the CT scan/Labs printed and given to patient for outpatient follow up.   patient remained stable in ED, improved well, results of the diagnostic studies reviewed and discussed with patient, Patient remained awake, alert, ambulatory and comfortable, tolerated PO. Appropriate medications given and effects reassessed.  Discussed with patient in detail about the need for close outpatient follow up and the need to return to ED for any persistent, or worsening symptoms, for any new symptoms/concerns. patient verbalized understanding and agreed. patient is given detail aftercare instructions and is instructed well to f/u as outpatient for further care.

## 2024-04-07 NOTE — ED PROVIDER NOTE - CARE PROVIDERS DIRECT ADDRESSES
[email or web address]

## 2024-04-07 NOTE — ED PROVIDER NOTE - ATTENDING APP SHARED VISIT CONTRIBUTION OF CARE
Patient is c/o Lt flank pain, no trauma. Patient with chronic back pain, denies any new neurologic symptoms. Denies any new changes in bladder/bowel habits. Denies HA/neck pain/cp/sob. NO f/c/rash.   Vitals reviewed.   Lungs: CTA, no wheezing, no crackles.  Abd: +BS, NT, ND, soft,  +LT CVA tenderness,   Back: No midline tenderness, +pain on ROM of the Lower back.   No saddle anaesthesia and is distally NVI.   All 4 ext have full ROM and are distally NVI.   CNS: awake, alert, o x 3, no focal neurologic deficits.  A/P: LT flank pain,   Chronic back pain,   Labs, CT,   symptomatic treatment,   reevaluation.

## 2024-04-07 NOTE — ED PROVIDER NOTE - DIFFERENTIAL DIAGNOSIS
Differential Diagnosis Pancreatitis, hepatic failure, obstructive uropathy, diverticulitis, colitis, abscess, bowel obstruction, bowel perforation. Electrolyte abnormalities, STEPHIE, and GI bleeding.

## 2024-04-07 NOTE — ED PROVIDER NOTE - CARE PLAN
1 Principal Discharge DX:	Back pain  Secondary Diagnosis:	Dilated cbd, acquired  Secondary Diagnosis:	Kidney cysts

## 2024-04-07 NOTE — ED PROVIDER NOTE - PHYSICAL EXAMINATION
CONSTITUTIONAL: in no apparent distress.   ENT: Hearing is intact with good acuity to spoken voice.  Patient is speaking clearly, not muffled and airway is intact.   RESPIRATORY: No signs of respiratory distress. Lung sounds are clear in all lobes bilaterally without rales, rhonchi, or wheezes.  CARDIOVASCULAR: Regular rate and rhythm.   GI: Abdomen is soft, non-tender, and without distention. Bowel sounds are present and normoactive in all four quadrants. No masses are noted.   BACK: No evidence of trauma or deformity. No midline tenderness. Tender to palpation in the L lower back. Normal ROM.   NEURO: A & O x 3. Normal speech. No focal deficit.  PSYCHOLOGICAL: Appropriate mood and affect. Good judgement and insight.

## 2024-04-07 NOTE — ED ADULT NURSE NOTE - NSFALLUNIVINTERV_ED_ALL_ED
Bed/Stretcher in lowest position, wheels locked, appropriate side rails in place/Call bell, personal items and telephone in reach/Instruct patient to call for assistance before getting out of bed/chair/stretcher/Non-slip footwear applied when patient is off stretcher/[place] to call system/Physically safe environment - no spills, clutter or unnecessary equipment/Purposeful proactive rounding/Room/bathroom lighting operational, light cord in reach

## 2024-04-07 NOTE — ED PROVIDER NOTE - OBJECTIVE STATEMENT
68-year-old male with past medical history of hypertension, hyperlipidemia, A-fib with PPM on Xarelto who presents to the ED with left flank pain.  Reports that symptoms started 2 months ago; pain is constant, radiating to his left leg, and there is no alleviating worsening factor.  Denies recent trauma injury to his back, saddle esthesia, loss of bladder/bowel movement.  Denies fever, shortness breath, chest pain, nausea, vomiting, and urinary symptoms.

## 2024-04-07 NOTE — ED PROVIDER NOTE - PATIENT PORTAL LINK FT
You can access the FollowMyHealth Patient Portal offered by [facility] by registering at the following website: [email or web address]. By joining [employer]’s FollowMyHealth portal, you will also be able to view your health information using other applications (apps) compatible with our system.

## 2024-04-07 NOTE — ED PROVIDER NOTE - PROVIDER TOKENS
PROVIDER:[TOKEN:[[id number]],FOLLOWUP:[1-3 Days]],PROVIDER:[TOKEN:[[id number]],FOLLOWUP:[1-3 Days]],PROVIDER:[TOKEN:[[id number]],FOLLOWUP:[1-3 Days]]

## 2024-04-07 NOTE — ED ADULT TRIAGE NOTE - CHIEF COMPLAINT QUOTE
pt complains of left flank pain that radiates to left abdominal area and hip, pt took 2 tylenol @ 1230

## 2024-04-08 LAB
CULTURE RESULTS: SIGNIFICANT CHANGE UP
SPECIMEN SOURCE: SIGNIFICANT CHANGE UP

## 2024-04-24 NOTE — ED PROVIDER NOTE - NSFOLLOWUPINSTRUCTIONS_ED_ALL_ED_FT
Please make sure to follow up with your primary care doctor in 3 days.    Back Pain    Back pain is very common in adults. The cause of back pain is rarely dangerous and the pain often gets better over time. The cause of your back pain may not be known and may include strain of muscles or ligaments, degeneration of the spinal disks, or arthritis. Occasionally the pain may radiate down your leg(s). Over-the-counter medicines to reduce pain and inflammation are often the most helpful. Stretching and remaining active frequently helps the healing process.     SEEK IMMEDIATE MEDICAL CARE IF YOU HAVE THE FOLLOWING SYMPTOMS: bowel or bladder control problems, unusual weakness or numbness in your arms or legs, nausea or vomiting, abdominal pain, fever, dizziness/lightheadedness.
196.2

## 2024-05-06 ENCOUNTER — APPOINTMENT (OUTPATIENT)
Dept: ELECTROPHYSIOLOGY | Facility: CLINIC | Age: 69
End: 2024-05-06
Payer: MEDICARE

## 2024-05-06 VITALS
DIASTOLIC BLOOD PRESSURE: 80 MMHG | WEIGHT: 195 LBS | SYSTOLIC BLOOD PRESSURE: 150 MMHG | BODY MASS INDEX: 30.61 KG/M2 | HEIGHT: 67 IN | HEART RATE: 60 BPM | TEMPERATURE: 97.1 F

## 2024-05-06 DIAGNOSIS — G47.33 OBSTRUCTIVE SLEEP APNEA (ADULT) (PEDIATRIC): ICD-10-CM

## 2024-05-06 DIAGNOSIS — Z45.018 ENCOUNTER FOR ADJUSTMENT AND MANAGEMENT OF OTHER PART OF CARDIAC PACEMAKER: ICD-10-CM

## 2024-05-06 DIAGNOSIS — I10 ESSENTIAL (PRIMARY) HYPERTENSION: ICD-10-CM

## 2024-05-06 DIAGNOSIS — I48.0 PAROXYSMAL ATRIAL FIBRILLATION: ICD-10-CM

## 2024-05-06 DIAGNOSIS — I48.3 TYPICAL ATRIAL FLUTTER: ICD-10-CM

## 2024-05-06 DIAGNOSIS — I48.19 OTHER PERSISTENT ATRIAL FIBRILLATION: ICD-10-CM

## 2024-05-06 DIAGNOSIS — I49.5 SICK SINUS SYNDROME: ICD-10-CM

## 2024-05-06 PROCEDURE — 99214 OFFICE O/P EST MOD 30 MIN: CPT | Mod: 25

## 2024-05-06 PROCEDURE — 93280 PM DEVICE PROGR EVAL DUAL: CPT

## 2024-05-06 PROCEDURE — 93000 ELECTROCARDIOGRAM COMPLETE: CPT | Mod: 59

## 2024-05-06 RX ORDER — GABAPENTIN 100 MG/1
100 CAPSULE ORAL 3 TIMES DAILY
Refills: 0 | Status: ACTIVE | COMMUNITY

## 2024-05-06 RX ORDER — RIVAROXABAN 20 MG/1
20 TABLET, FILM COATED ORAL DAILY
Refills: 0 | Status: ACTIVE | COMMUNITY

## 2024-05-06 RX ORDER — FUROSEMIDE 20 MG/1
20 TABLET ORAL EVERY OTHER DAY
Refills: 0 | Status: ACTIVE | COMMUNITY

## 2024-05-06 RX ORDER — HYDROCHLOROTHIAZIDE 12.5 MG/1
12.5 TABLET ORAL DAILY
Refills: 0 | Status: COMPLETED | COMMUNITY
End: 2024-05-06

## 2024-05-06 RX ORDER — AMLODIPINE BESYLATE 10 MG/1
10 TABLET ORAL
Refills: 0 | Status: ACTIVE | COMMUNITY

## 2024-05-06 RX ORDER — BLOOD SUGAR DIAGNOSTIC
STRIP MISCELLANEOUS DAILY
Refills: 0 | Status: ACTIVE | COMMUNITY
Start: 2022-10-13

## 2024-05-06 RX ORDER — ATORVASTATIN CALCIUM 10 MG/1
10 TABLET, FILM COATED ORAL
Refills: 0 | Status: COMPLETED | COMMUNITY
Start: 2022-09-26 | End: 2024-05-06

## 2024-05-06 RX ORDER — ATORVASTATIN CALCIUM 20 MG/1
20 TABLET, FILM COATED ORAL DAILY
Refills: 0 | Status: ACTIVE | COMMUNITY

## 2024-05-06 RX ORDER — TIMOLOL MALEATE 5 MG/ML
0.5 SOLUTION OPHTHALMIC
Qty: 30 | Refills: 0 | Status: ACTIVE | COMMUNITY
Start: 2022-01-10

## 2024-05-06 RX ORDER — BIMATOPROST 0.1 MG/ML
0.01 SOLUTION/ DROPS OPHTHALMIC DAILY
Refills: 0 | Status: ACTIVE | COMMUNITY

## 2024-05-16 RX ORDER — METOPROLOL SUCCINATE 25 MG/1
25 TABLET, EXTENDED RELEASE ORAL
Qty: 180 | Refills: 3 | Status: ACTIVE | COMMUNITY
Start: 2023-04-18 | End: 1900-01-01

## 2024-05-26 PROBLEM — I48.19 PERSISTENT ATRIAL FIBRILLATION: Status: ACTIVE | Noted: 2024-05-26

## 2024-05-26 PROBLEM — I48.0 PAROXYSMAL ATRIAL FIBRILLATION: Noted: 2022-12-09

## 2024-05-26 NOTE — PHYSICAL EXAM
[Left Infraclavicular] : left infraclavicular area [Clean] : clean [Dry] : dry [Well-Healed] : well-healed [Well Developed] : well developed [Well Nourished] : well nourished [No Acute Distress] : no acute distress [Normal Conjunctiva] : normal conjunctiva [Normal Venous Pressure] : normal venous pressure [No Carotid Bruit] : no carotid bruit [Normal S1, S2] : normal S1, S2 [No Murmur] : no murmur [No Rub] : no rub [No Gallop] : no gallop [Clear Lung Fields] : clear lung fields [Good Air Entry] : good air entry [No Respiratory Distress] : no respiratory distress  [Soft] : abdomen soft [Non Tender] : non-tender [No Masses/organomegaly] : no masses/organomegaly [Normal Bowel Sounds] : normal bowel sounds [Normal Gait] : normal gait [No Cyanosis] : no cyanosis [No Clubbing] : no clubbing [No Varicosities] : no varicosities [No Rash] : no rash [No Skin Lesions] : no skin lesions [Moves all extremities] : moves all extremities [No Focal Deficits] : no focal deficits [Normal Speech] : normal speech [Alert and Oriented] : alert and oriented [Normal memory] : normal memory [Erythema] : not erythematous [de-identified] : +1 edema right LE

## 2024-05-26 NOTE — PROCEDURE
[No] : not [Atrial Fibrillation] : atrial fibrillation [See Device Printout] : See device printout [Pacemaker] : pacemaker [Voltage: ___ volts] : Voltage was [unfilled] volts [Longevity: ___ months] : The estimated remaining battery life is [unfilled] months [Threshold Testing Performed] : Threshold testing was performed [Atrial] : Atrial [Ventricular] : Ventricular [Lead Imp:  ___ohms] : lead impedance was [unfilled] ohms [Sensing Amplitude ___mv] : sensing amplitude was [unfilled] mv [___V @] : [unfilled] V [___ ms] : [unfilled] ms [None] : none [Programmed for Longevity] : output reprogrammed for improved battery longevity [Counters Reset] : the counters were reset [de-identified] : Medtronic Rosario XT MRI [de-identified] : 64 bpm [de-identified] : [id number] [de-identified] : [id number] [de-identified] : 3/23/2023 [de-identified] :  [de-identified] : WANDY [de-identified] :  67.1% AP 30.3% afib burden 61.4% 1 AF episode

## 2024-05-26 NOTE — DISCUSSION/SUMMARY
[FreeTextEntry1] : Mr. Edvin Ribeiro is a pleasant 68-year-old man with hypertension, hyperlipidemia, obstructive sleep apnea on CPAP, non-obstructive CAD, Obesity with a BMI of 30.5, moderate aortic stenosis, paroxysmal atrial fibrillation, atrial flutter, sinus pauses, and tachy-jose syndrome. Patient was diagnosed with atrial flutter in October 2022 and atrial fibrillation in November 2022. He was on Metoprolol for rate control, and he is on Xarelto. Metoprolol was stopped due to pauses. On 3/23/2023- MEDTRONIC dual chamber PPM    I discussed with patient the management strategy for atrial fibrillation at length, including medical therapy with calcium channel blocker and beta blocker, antiarrhythmics therapy, and catheter ablation. I also discussed stroke prevention at length. I recommend continuing the same medication as patient is rate controlled at metoprolol 50. I also recommend continuing Xarelto for stroke prevention.  The patient will be scheduled for BRANDI/DCCV with Dr. Cano.  I interrogated and reprogrammed his device as described in procedure. His wound is healed properly, with no signs of inflammation, infection or bleeding. I discussed with patient plan of care in great details. 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 I answered all his questions to his satisfaction. Patient was pleased with the visit.    Patient will follow with me in 6 months time. Please do not hesitate to contact me at [phone] if you have any further questions regarding this patient care.

## 2024-05-26 NOTE — REASON FOR VISIT
[___ Device Check] : is here today for a [unfilled] device check for [Spouse] : spouse [Arrhythmia/ECG Abnorrmalities] : arrhythmia/ECG abnormalities [FreeTextEntry3] : Dr. Madi Franklin - Dr. Beena Cano

## 2024-05-26 NOTE — CARDIOLOGY SUMMARY
[de-identified] : (05/06/2024) Atrial fibrillation/flutter at 60 bpm, V paced rhythm (3/13/2023)/sinus rhythm at 66 bpm, LVH, 1st degree AV block with  ms, no significant STT wave abnormalities.  (12/9/2022)/ Sinus Rhythm at 57 bpm, LVH, 1st degree AV block with  ms, no significant STT wave abnormalities.  (11/14/2022)/ Coarse Atrial fibrillation at 58 bpm, possibly atrial flutter.  (10/11/2022)/ Typical atrial flutter, with 4:1 AV conduction and ventricular rate at 58 bpm. [de-identified] : (Dec 2022) MCOT: multiple pauses longest 4.3 sec in sinus on 1/9/2023 noon time. Total pauses 17 pauses > 2 sec. AF burden 5% [de-identified] :  (6/6/2022)/ 2D echo LV size normal, LV EF 57%, LA mildly dilated, mild-moderate valve stenosis, mild MR, mild TR, no other abnormalities. \par  (10/26/2020)/ 2D echo mild LVH, normal LV EF, LA size is normal, mild-moderate aortic stenosis, trace MR, trace TR, no other abnormalities. [de-identified] : 3/23/2023- MEDTRONIC dual chamber PPM [de-identified] : (Feb 2023) cardiac cath: non-obstructive CAD

## 2024-05-26 NOTE — HISTORY OF PRESENT ILLNESS
[de-identified] : Cardiology: Dr. Cano.\par  \par  \par   [FreeTextEntry1] : Hypertension, Hyperlipidemia, Moderate Aortic Stenosis, Obstructive Sleep Apnea on CPAP, non-obstructive CAD, Obesity with a BMI of 30.5, new onset Paroxysmal Atrial Fibrillation, Atrial Flutter, sinus pauses, tachy-jose syndrome.  Patient was diagnosed with an atrial flutter on a routine ECG with his primary care doctor in October 2022. He saw Dr. Cano on 11/14/2022, and ECG showed atrial fibrillation. He is being treated with Metoprolol for rate control and Xarelto for stroke prevention. Patient has no bleeding on Xarelto. He reports feeling tired and short of breath when he has afib. He also reports feeling dizzy and fainty without having any real syncope. He had dizziness for a long time, but in the recent few weeks, the dizziness got worse.  Patient was seen in Dec 2022 and given MCOT: multiple pauses longest 4.3 sec in sinus on 1/9/2023 noon time. Total pauses 17 pauses > 2 sec. AF burden 5%.  05/06/2024; He complains of fatigue and right ankle swelling for the past 4 months but has no chest pain, no shortness of breath, no dyspnea on exertion, no orthopnea, no PND. He denies syncope. He has no exertional symptoms. He presents for evaluation.

## 2024-05-26 NOTE — REVIEW OF SYSTEMS
[Lower Ext Edema] : lower extremity edema [Negative] : Neurological [Dyspnea on exertion] : not dyspnea during exertion [SOB] : no shortness of breath [Chest Discomfort] : no chest discomfort [Leg Claudication] : no intermittent leg claudication [Palpitations] : no palpitations [Orthopnea] : no orthopnea [PND] : no PND [Syncope] : no syncope [Dizziness] : no dizziness

## 2024-05-29 RX ORDER — METOPROLOL TARTRATE 50 MG
1 TABLET ORAL
Qty: 0 | Refills: 0 | DISCHARGE

## 2024-05-29 RX ORDER — HYDROCHLOROTHIAZIDE 25 MG
1 TABLET ORAL
Refills: 0 | DISCHARGE

## 2024-05-29 RX ORDER — TIMOLOL 0.5 %
1 DROPS OPHTHALMIC (EYE)
Qty: 0 | Refills: 0 | DISCHARGE

## 2024-05-29 RX ORDER — ATORVASTATIN CALCIUM 80 MG/1
0 TABLET, FILM COATED ORAL
Qty: 0 | Refills: 0 | DISCHARGE

## 2024-05-29 RX ORDER — RIVAROXABAN 15 MG-20MG
1 KIT ORAL
Qty: 0 | Refills: 0 | DISCHARGE

## 2024-05-29 RX ORDER — AMLODIPINE BESYLATE 2.5 MG/1
1 TABLET ORAL
Qty: 0 | Refills: 0 | DISCHARGE

## 2024-05-29 RX ORDER — METOPROLOL TARTRATE 50 MG
1 TABLET ORAL
Refills: 0 | DISCHARGE

## 2024-05-29 RX ORDER — BIMATOPROST 0.3 MG/ML
1 SOLUTION/ DROPS OPHTHALMIC
Qty: 0 | Refills: 0 | DISCHARGE

## 2024-05-30 ENCOUNTER — NON-APPOINTMENT (OUTPATIENT)
Age: 69
End: 2024-05-30

## 2024-05-31 ENCOUNTER — RESULT REVIEW (OUTPATIENT)
Age: 69
End: 2024-05-31

## 2024-05-31 ENCOUNTER — OUTPATIENT (OUTPATIENT)
Dept: OUTPATIENT SERVICES | Facility: HOSPITAL | Age: 69
LOS: 1 days | Discharge: HOME | End: 2024-05-31
Payer: MEDICARE

## 2024-05-31 VITALS — HEIGHT: 67 IN | WEIGHT: 195.11 LBS

## 2024-05-31 VITALS — HEIGHT: 67 IN | WEIGHT: 195.33 LBS

## 2024-05-31 DIAGNOSIS — Z98.890 OTHER SPECIFIED POSTPROCEDURAL STATES: Chronic | ICD-10-CM

## 2024-05-31 DIAGNOSIS — I48.0 PAROXYSMAL ATRIAL FIBRILLATION: ICD-10-CM

## 2024-05-31 PROCEDURE — 93320 DOPPLER ECHO COMPLETE: CPT | Mod: 26

## 2024-05-31 PROCEDURE — 93312 ECHO TRANSESOPHAGEAL: CPT

## 2024-05-31 PROCEDURE — 93312 ECHO TRANSESOPHAGEAL: CPT | Mod: 26,XU

## 2024-05-31 PROCEDURE — 93325 DOPPLER ECHO COLOR FLOW MAPG: CPT | Mod: 26

## 2024-05-31 PROCEDURE — 93325 DOPPLER ECHO COLOR FLOW MAPG: CPT

## 2024-05-31 PROCEDURE — 92960 CARDIOVERSION ELECTRIC EXT: CPT

## 2024-05-31 PROCEDURE — 93320 DOPPLER ECHO COMPLETE: CPT

## 2024-05-31 RX ORDER — AMIODARONE HYDROCHLORIDE 400 MG/1
0 TABLET ORAL
Qty: 45 | Refills: 0
Start: 2024-05-31

## 2024-05-31 RX ORDER — AMIODARONE HYDROCHLORIDE 400 MG/1
0 TABLET ORAL
Refills: 0 | DISCHARGE

## 2024-05-31 RX ORDER — AMIODARONE HYDROCHLORIDE 400 MG/1
0 TABLET ORAL
Qty: 60 | Refills: 0
Start: 2024-05-31

## 2024-05-31 RX ORDER — AMIODARONE HYDROCHLORIDE 400 MG/1
2 TABLET ORAL
Qty: 56 | Refills: 0
Start: 2024-05-31 | End: 2024-06-13

## 2024-05-31 RX ORDER — AMIODARONE HYDROCHLORIDE 400 MG/1
1 TABLET ORAL
Qty: 30 | Refills: 3
Start: 2024-05-31 | End: 2024-09-27

## 2024-05-31 NOTE — CHART NOTE - NSCHARTNOTEFT_GEN_A_CORE
POST OPERATIVE PROCEDURAL DOCUMENTATION  PRE-OP DIAGNOSIS: Atrial Fibrillation    POST-OP DIAGNOSIS: Afib s/p successful cardioversion    PROCEDURE: Transesophageal echocardiogram    Primary Physician: Dr. Cano  Assistant: Dr. Aceves    ANESTHESIA TYPE  [  ] General Anesthesia  [ x ] Conscious Sedation  [  ] Local/Regional    CONDITION  [  ] Critical  [  ] Serious  [  ] Fair  [ X ] Good    SPECIMENS REMOVED (IF APPLICABLE): N/A    IMPLANTS (IF APPLICABLE): None    ESTIMATED BLOOD LOSS: None    COMPLICATIONS: None      FINDINGS:    After risks and benefits of procedures were explained, informed consent was obtained and placed in chart.  Refer to Anesthesia note for sedation details.  The BRANDI probe was passed into the esophagus without difficulty.  Transesophageal images were obtained.  The BRANDI probe was removed without difficulty and examined.  There was no evidence for bleeding.  The patient tolerated the procedure well without any immediate BRANDI-related complications.      Preliminary Findings:  CODY: Left atrial appendage was clear of clot and smoke.  LV: LVEF was estimated at 55-65%  MV: Mild MR, No evidence for MS.   AV: No evidence for AI, no evidence for AS.   TV: no TR.   IAS: no PFO. NO R-> L shunt.   There was mild, non-mobile atheroma seen in the thoracic aorta.     Patient successfully converted to sinus rhythm with synchronized  200 J x 2 of direct current cardioversion.    DIAGNOSIS/IMPRESSION: Afib s/p successful cardioversion    PLAN OF CARE:  - Cont home medication including anticoagulation  - Received IV amiodarone during procedure  - Will discharge on amiodarone PO  - F/U with cardiologist in 2-3 weeks

## 2024-05-31 NOTE — H&P CARDIOLOGY - HISTORY OF PRESENT ILLNESS
68-year-old male with past medical history of hypertension, hyperlipidemia, A-fib with PPM on Xarelto  presenting for BRANDI/cardioversion. Pt has been persistently in afib for the last3 month with associated symptoms of LE edema and SOB

## 2024-06-05 DIAGNOSIS — I48.91 UNSPECIFIED ATRIAL FIBRILLATION: ICD-10-CM

## 2024-08-06 ENCOUNTER — NON-APPOINTMENT (OUTPATIENT)
Age: 69
End: 2024-08-06

## 2024-08-06 ENCOUNTER — APPOINTMENT (OUTPATIENT)
Dept: CARDIOLOGY | Facility: CLINIC | Age: 69
End: 2024-08-06

## 2024-08-06 PROCEDURE — 93296 REM INTERROG EVL PM/IDS: CPT

## 2024-08-06 PROCEDURE — 93294 REM INTERROG EVL PM/LDLS PM: CPT

## 2024-09-26 ENCOUNTER — OUTPATIENT (OUTPATIENT)
Dept: OUTPATIENT SERVICES | Facility: HOSPITAL | Age: 69
LOS: 1 days | End: 2024-09-26
Payer: MEDICARE

## 2024-09-26 DIAGNOSIS — R07.9 CHEST PAIN, UNSPECIFIED: ICD-10-CM

## 2024-09-26 DIAGNOSIS — Z98.890 OTHER SPECIFIED POSTPROCEDURAL STATES: Chronic | ICD-10-CM

## 2024-09-26 PROCEDURE — 71046 X-RAY EXAM CHEST 2 VIEWS: CPT

## 2024-09-26 PROCEDURE — 71046 X-RAY EXAM CHEST 2 VIEWS: CPT | Mod: 26

## 2024-09-27 DIAGNOSIS — R07.9 CHEST PAIN, UNSPECIFIED: ICD-10-CM

## 2024-10-22 ENCOUNTER — TRANSCRIPTION ENCOUNTER (OUTPATIENT)
Age: 69
End: 2024-10-22

## 2024-10-22 ENCOUNTER — OUTPATIENT (OUTPATIENT)
Dept: OUTPATIENT SERVICES | Facility: HOSPITAL | Age: 69
LOS: 1 days | Discharge: HOME | End: 2024-10-22
Payer: MEDICARE

## 2024-10-22 VITALS
SYSTOLIC BLOOD PRESSURE: 131 MMHG | DIASTOLIC BLOOD PRESSURE: 72 MMHG | HEART RATE: 60 BPM | OXYGEN SATURATION: 94 % | RESPIRATION RATE: 18 BRPM

## 2024-10-22 VITALS
OXYGEN SATURATION: 97 % | HEIGHT: 67 IN | RESPIRATION RATE: 18 BRPM | TEMPERATURE: 98 F | HEART RATE: 60 BPM | DIASTOLIC BLOOD PRESSURE: 92 MMHG | SYSTOLIC BLOOD PRESSURE: 171 MMHG | WEIGHT: 199.96 LBS

## 2024-10-22 DIAGNOSIS — K83.8 OTHER SPECIFIED DISEASES OF BILIARY TRACT: ICD-10-CM

## 2024-10-22 DIAGNOSIS — Z98.890 OTHER SPECIFIED POSTPROCEDURAL STATES: Chronic | ICD-10-CM

## 2024-10-22 DIAGNOSIS — R93.5 ABNORMAL FINDINGS ON DIAGNOSTIC IMAGING OF OTHER ABDOMINAL REGIONS, INCLUDING RETROPERITONEUM: ICD-10-CM

## 2024-10-22 PROCEDURE — 88173 CYTOPATH EVAL FNA REPORT: CPT

## 2024-10-22 PROCEDURE — 88305 TISSUE EXAM BY PATHOLOGIST: CPT

## 2024-10-22 PROCEDURE — 88173 CYTOPATH EVAL FNA REPORT: CPT | Mod: 26

## 2024-10-22 PROCEDURE — 88312 SPECIAL STAINS GROUP 1: CPT

## 2024-10-22 PROCEDURE — 88305 TISSUE EXAM BY PATHOLOGIST: CPT | Mod: 26

## 2024-10-22 PROCEDURE — 88312 SPECIAL STAINS GROUP 1: CPT | Mod: 26

## 2024-10-22 NOTE — CHART NOTE - NSCHARTNOTEFT_GEN_A_CORE
PACU ANESTHESIA ADMISSION NOTE      Procedure:   Post op diagnosis:      ____  Intubated  TV:______       Rate: ______      FiO2: ______    _x___  Patent Airway    _x___  Full return of protective reflexes    _x___  Full recovery from anesthesia / back to baseline status    Vitals:  T(C): 36.8 (10-22-24 @ 15:56), Max: 36.8 (10-22-24 @ 15:56)  HR: 60 (10-22-24 @ 16:26) (60 - 60)  BP: 131/72 (10-22-24 @ 16:26) (110/65 - 171/92)  RR: 18 (10-22-24 @ 16:26) (18 - 18)  SpO2: 94% (10-22-24 @ 16:26) (93% - 97%)    Mental Status:  _x___ Awake   _____ Alert   _____ Drowsy   _____ Sedated    Nausea/Vomiting:  _x___  NO       ______Yes,   See Post - Op Orders         Pain Scale (0-10):  __0___    Treatment: _x___ None    ____ See Post - Op/PCA Orders    Post - Operative Fluids:   __x__ Oral   ____ See Post - Op Orders    Plan: Discharge:   _x___Home       _____Floor     _____Critical Care    _____  Other:_________________    Comments:  No anesthesia issues or complications noted.  Discharge when criteria met.

## 2024-10-22 NOTE — ASU PATIENT PROFILE, ADULT - FALL HARM RISK - TYPE OF ASSESSMENT
Reached out to pharmacy regarding missing medication doses. Will administer as soon as received.      Luigi Blackwell RN  12/19/22 [id number] Admission

## 2024-10-22 NOTE — ASU DISCHARGE PLAN (ADULT/PEDIATRIC) - FINANCIAL ASSISTANCE
[facility] provides services at a reduced cost to those who are determined to be eligible through [facility]’s financial assistance program. Information regarding [facility]’s financial assistance program can be found by going to [email or web address] or by calling [phone].

## 2024-10-22 NOTE — ASU DISCHARGE PLAN (ADULT/PEDIATRIC) - NS MD DC FALL RISK RISK
For information on Fall & Injury Prevention, visit: [email or web address] OR  [email or web address] OR  https://www.cdc.gov/steadi/patient.html

## 2024-10-24 LAB — SURGICAL PATHOLOGY STUDY: SIGNIFICANT CHANGE UP

## 2024-10-25 LAB — NON-GYNECOLOGICAL CYTOLOGY STUDY: SIGNIFICANT CHANGE UP

## 2024-10-28 DIAGNOSIS — K83.8 OTHER SPECIFIED DISEASES OF BILIARY TRACT: ICD-10-CM

## 2024-10-28 DIAGNOSIS — G47.33 OBSTRUCTIVE SLEEP APNEA (ADULT) (PEDIATRIC): ICD-10-CM

## 2024-10-28 DIAGNOSIS — I10 ESSENTIAL (PRIMARY) HYPERTENSION: ICD-10-CM

## 2024-10-28 DIAGNOSIS — K31.89 OTHER DISEASES OF STOMACH AND DUODENUM: ICD-10-CM

## 2024-10-28 DIAGNOSIS — I48.0 PAROXYSMAL ATRIAL FIBRILLATION: ICD-10-CM

## 2024-10-28 DIAGNOSIS — K29.50 UNSPECIFIED CHRONIC GASTRITIS WITHOUT BLEEDING: ICD-10-CM

## 2024-10-28 DIAGNOSIS — E78.00 PURE HYPERCHOLESTEROLEMIA, UNSPECIFIED: ICD-10-CM

## 2024-10-28 DIAGNOSIS — K86.89 OTHER SPECIFIED DISEASES OF PANCREAS: ICD-10-CM

## 2024-10-28 DIAGNOSIS — R93.3 ABNORMAL FINDINGS ON DIAGNOSTIC IMAGING OF OTHER PARTS OF DIGESTIVE TRACT: ICD-10-CM

## 2024-10-28 DIAGNOSIS — Z79.01 LONG TERM (CURRENT) USE OF ANTICOAGULANTS: ICD-10-CM

## 2024-10-28 DIAGNOSIS — F41.9 ANXIETY DISORDER, UNSPECIFIED: ICD-10-CM

## 2024-10-28 DIAGNOSIS — E66.9 OBESITY, UNSPECIFIED: ICD-10-CM

## 2024-11-06 ENCOUNTER — NON-APPOINTMENT (OUTPATIENT)
Age: 69
End: 2024-11-06

## 2024-11-06 ENCOUNTER — APPOINTMENT (OUTPATIENT)
Dept: CARDIOLOGY | Facility: CLINIC | Age: 69
End: 2024-11-06
Payer: MEDICARE

## 2024-11-06 PROCEDURE — 93296 REM INTERROG EVL PM/IDS: CPT

## 2024-11-06 PROCEDURE — 93294 REM INTERROG EVL PM/LDLS PM: CPT

## 2024-12-09 ENCOUNTER — APPOINTMENT (OUTPATIENT)
Dept: ELECTROPHYSIOLOGY | Facility: CLINIC | Age: 69
End: 2024-12-09
Payer: MEDICARE

## 2024-12-09 VITALS
BODY MASS INDEX: 31.39 KG/M2 | SYSTOLIC BLOOD PRESSURE: 160 MMHG | HEIGHT: 67 IN | DIASTOLIC BLOOD PRESSURE: 98 MMHG | HEART RATE: 59 BPM | WEIGHT: 200 LBS

## 2024-12-09 DIAGNOSIS — Z48.89 ENCOUNTER FOR OTHER SPECIFIED SURGICAL AFTERCARE: ICD-10-CM

## 2024-12-09 DIAGNOSIS — E78.00 PURE HYPERCHOLESTEROLEMIA, UNSPECIFIED: ICD-10-CM

## 2024-12-09 DIAGNOSIS — G47.33 OBSTRUCTIVE SLEEP APNEA (ADULT) (PEDIATRIC): ICD-10-CM

## 2024-12-09 DIAGNOSIS — Z45.018 ENCOUNTER FOR ADJUSTMENT AND MANAGEMENT OF OTHER PART OF CARDIAC PACEMAKER: ICD-10-CM

## 2024-12-09 DIAGNOSIS — I10 ESSENTIAL (PRIMARY) HYPERTENSION: ICD-10-CM

## 2024-12-09 DIAGNOSIS — I49.5 SICK SINUS SYNDROME: ICD-10-CM

## 2024-12-09 DIAGNOSIS — I45.5 OTHER SPECIFIED HEART BLOCK: ICD-10-CM

## 2024-12-09 DIAGNOSIS — I48.19 OTHER PERSISTENT ATRIAL FIBRILLATION: ICD-10-CM

## 2024-12-09 PROCEDURE — 93280 PM DEVICE PROGR EVAL DUAL: CPT

## 2024-12-09 PROCEDURE — 99214 OFFICE O/P EST MOD 30 MIN: CPT | Mod: 25

## 2024-12-09 PROCEDURE — 93000 ELECTROCARDIOGRAM COMPLETE: CPT | Mod: 59

## 2024-12-09 RX ORDER — AMIODARONE HYDROCHLORIDE 200 MG/1
200 TABLET ORAL EVERY OTHER DAY
Refills: 0 | Status: ACTIVE | COMMUNITY
Start: 2024-12-09

## 2024-12-24 NOTE — PATIENT PROFILE ADULT - NSPROPTRIGHTSUPPORTPERSON_GEN_A_NUR
Last Appointment:  12/18/2024  Future Appointments   Date Time Provider Department Center   1/30/2025 10:00 AM Awadalla, Bahaa A, MD [facility]   10/1/2025 10:00 AM Awadalla, Bahaa A, MD [facility] DE[facility]        Requested Prescriptions     Pending Prescriptions Disp Refills    metoprolol tartrate (LOPRESSOR) 25 MG tablet [Pharmacy Med Name: METOPROLOL TARTRATE 25MG TABLETS] 60 tablet 3     Sig: TAKE 1 TABLET BY MOUTH EVERY NIGHT      declines

## 2025-03-10 ENCOUNTER — NON-APPOINTMENT (OUTPATIENT)
Age: 70
End: 2025-03-10

## 2025-03-10 ENCOUNTER — APPOINTMENT (OUTPATIENT)
Dept: CARDIOLOGY | Facility: CLINIC | Age: 70
End: 2025-03-10
Payer: MEDICARE

## 2025-03-10 PROCEDURE — 93294 REM INTERROG EVL PM/LDLS PM: CPT

## 2025-03-10 PROCEDURE — 93296 REM INTERROG EVL PM/IDS: CPT

## 2025-06-11 ENCOUNTER — NON-APPOINTMENT (OUTPATIENT)
Age: 70
End: 2025-06-11

## 2025-06-11 ENCOUNTER — APPOINTMENT (OUTPATIENT)
Dept: CARDIOLOGY | Facility: CLINIC | Age: 70
End: 2025-06-11
Payer: MEDICARE

## 2025-06-11 PROCEDURE — 93294 REM INTERROG EVL PM/LDLS PM: CPT

## 2025-06-11 PROCEDURE — 93296 REM INTERROG EVL PM/IDS: CPT

## 2025-06-17 ENCOUNTER — NON-APPOINTMENT (OUTPATIENT)
Age: 70
End: 2025-06-17

## 2025-06-17 PROBLEM — Z87.09 HISTORY OF ALLERGIC RHINITIS: Status: RESOLVED | Noted: 2025-06-17 | Resolved: 2025-06-17

## 2025-06-17 PROBLEM — Z86.59 HISTORY OF ANXIETY: Status: RESOLVED | Noted: 2025-06-17 | Resolved: 2025-06-17

## 2025-06-17 PROBLEM — Z87.19 HISTORY OF ESOPHAGEAL REFLUX: Status: RESOLVED | Noted: 2025-06-17 | Resolved: 2025-06-17

## 2025-07-28 RX ORDER — DABIGATRAN ETEXILATE 150 MG/1
150 CAPSULE ORAL TWICE DAILY
Qty: 180 | Refills: 3 | Status: ACTIVE | COMMUNITY
Start: 2025-07-28

## 2025-08-19 ENCOUNTER — APPOINTMENT (OUTPATIENT)
Dept: ELECTROPHYSIOLOGY | Facility: CLINIC | Age: 70
End: 2025-08-19
Payer: MEDICARE

## 2025-08-19 VITALS
SYSTOLIC BLOOD PRESSURE: 190 MMHG | DIASTOLIC BLOOD PRESSURE: 100 MMHG | HEIGHT: 67 IN | HEART RATE: 63 BPM | WEIGHT: 205 LBS | BODY MASS INDEX: 32.18 KG/M2

## 2025-08-19 DIAGNOSIS — Z86.39 PERSONAL HISTORY OF OTHER ENDOCRINE, NUTRITIONAL AND METABOLIC DISEASE: ICD-10-CM

## 2025-08-19 DIAGNOSIS — I48.3 TYPICAL ATRIAL FLUTTER: ICD-10-CM

## 2025-08-19 DIAGNOSIS — I48.19 OTHER PERSISTENT ATRIAL FIBRILLATION: ICD-10-CM

## 2025-08-19 DIAGNOSIS — I45.5 OTHER SPECIFIED HEART BLOCK: ICD-10-CM

## 2025-08-19 DIAGNOSIS — I10 ESSENTIAL (PRIMARY) HYPERTENSION: ICD-10-CM

## 2025-08-19 DIAGNOSIS — Z86.69 PERSONAL HISTORY OF OTHER DISEASES OF THE NERVOUS SYSTEM AND SENSE ORGANS: ICD-10-CM

## 2025-08-19 DIAGNOSIS — S76.219A STRAIN OF ADDUCTOR MUSCLE, FASCIA AND TENDON OF UNSPECIFIED THIGH, INITIAL ENCOUNTER: ICD-10-CM

## 2025-08-19 PROCEDURE — 93280 PM DEVICE PROGR EVAL DUAL: CPT

## 2025-08-19 PROCEDURE — 93000 ELECTROCARDIOGRAM COMPLETE: CPT | Mod: 59

## 2025-08-19 PROCEDURE — 99215 OFFICE O/P EST HI 40 MIN: CPT | Mod: 25

## 2025-08-19 RX ORDER — ESCITALOPRAM OXALATE 10 MG/1
10 TABLET ORAL
Refills: 0 | Status: ACTIVE | COMMUNITY

## 2025-08-19 RX ORDER — LOSARTAN POTASSIUM 100 MG/1
100 TABLET, FILM COATED ORAL DAILY
Refills: 0 | Status: ACTIVE | COMMUNITY

## 2025-09-18 ENCOUNTER — APPOINTMENT (OUTPATIENT)
Dept: CARDIOLOGY | Facility: CLINIC | Age: 70
End: 2025-09-18

## 2025-09-18 VITALS
BODY MASS INDEX: 32.02 KG/M2 | SYSTOLIC BLOOD PRESSURE: 178 MMHG | HEIGHT: 67 IN | DIASTOLIC BLOOD PRESSURE: 98 MMHG | WEIGHT: 204 LBS | HEART RATE: 63 BPM

## 2025-09-23 PROBLEM — R53.81 PHYSICAL DECONDITIONING: Status: ACTIVE | Noted: 2025-09-23

## 2025-09-23 PROBLEM — R09.82 PND (POST-NASAL DRIP): Status: ACTIVE | Noted: 2025-09-23

## 2025-09-23 PROBLEM — R06.09 DYSPNEA ON EXERTION: Status: ACTIVE | Noted: 2025-09-23

## 2025-09-23 PROBLEM — K21.9 GERD (GASTROESOPHAGEAL REFLUX DISEASE): Status: ACTIVE | Noted: 2025-09-23

## 2025-09-23 PROBLEM — Z13.83: Status: ACTIVE | Noted: 2025-09-23

## 2025-09-23 PROBLEM — E66.811 OBESITY (BMI 30.0-34.9): Status: ACTIVE | Noted: 2025-09-23

## 2025-09-23 PROBLEM — G47.33 OSA (OBSTRUCTIVE SLEEP APNEA): Status: ACTIVE | Noted: 2025-09-23

## 2025-09-23 PROBLEM — R94.2 RESTRICTIVE PATTERN PRESENT ON PULMONARY FUNCTION TESTING: Status: ACTIVE | Noted: 2025-09-23
